# Patient Record
Sex: MALE | Race: ASIAN | NOT HISPANIC OR LATINO | Employment: OTHER | URBAN - METROPOLITAN AREA
[De-identification: names, ages, dates, MRNs, and addresses within clinical notes are randomized per-mention and may not be internally consistent; named-entity substitution may affect disease eponyms.]

---

## 2019-01-11 DIAGNOSIS — I10 HYPERTENSION: ICD-10-CM

## 2019-01-11 DIAGNOSIS — I50.9 CHF (CONGESTIVE HEART FAILURE): ICD-10-CM

## 2019-01-11 DIAGNOSIS — I50.9 CONGESTIVE HEART DISEASE: Primary | ICD-10-CM

## 2021-12-01 ENCOUNTER — HOSPITAL ENCOUNTER (INPATIENT)
Facility: HOSPITAL | Age: 81
LOS: 3 days | Discharge: HOME | DRG: 202 | End: 2021-12-04
Attending: EMERGENCY MEDICINE | Admitting: EMERGENCY MEDICINE
Payer: MEDICARE

## 2021-12-01 DIAGNOSIS — S30.1XXA HEMATOMA OF RECTUS SHEATH, INITIAL ENCOUNTER: Primary | ICD-10-CM

## 2021-12-01 DIAGNOSIS — D64.9 ANEMIA, UNSPECIFIED TYPE: ICD-10-CM

## 2021-12-01 DIAGNOSIS — J44.9 COPD (CHRONIC OBSTRUCTIVE PULMONARY DISEASE): ICD-10-CM

## 2021-12-01 DIAGNOSIS — R06.02 SHORTNESS OF BREATH: ICD-10-CM

## 2021-12-01 DIAGNOSIS — J20.9 ACUTE BRONCHITIS, UNSPECIFIED ORGANISM: ICD-10-CM

## 2021-12-01 DIAGNOSIS — R07.9 CHEST PAIN: ICD-10-CM

## 2021-12-01 DIAGNOSIS — E87.1 HYPONATREMIA: ICD-10-CM

## 2021-12-01 PROBLEM — I27.20 PULMONARY HYPERTENSION: Status: ACTIVE | Noted: 2021-12-01

## 2021-12-01 PROBLEM — E86.0 DEHYDRATION WITH HYPONATREMIA: Status: ACTIVE | Noted: 2021-12-01

## 2021-12-01 PROBLEM — E03.8 SUBCLINICAL HYPOTHYROIDISM: Status: RESOLVED | Noted: 2021-12-01 | Resolved: 2021-12-01

## 2021-12-01 PROBLEM — R93.429 ABNORMAL CT SCAN, KIDNEY: Status: ACTIVE | Noted: 2021-12-01

## 2021-12-01 PROBLEM — E03.8 SUBCLINICAL HYPOTHYROIDISM: Status: ACTIVE | Noted: 2021-12-01

## 2021-12-01 PROBLEM — T14.8XXA INTRAMUSCULAR HEMATOMA: Status: ACTIVE | Noted: 2021-12-01

## 2021-12-01 PROBLEM — E27.8 ADRENAL NODULE: Status: ACTIVE | Noted: 2021-12-01

## 2021-12-01 PROBLEM — E27.9 ADRENAL NODULE: Status: ACTIVE | Noted: 2021-12-01

## 2021-12-01 PROBLEM — N18.32 STAGE 3B CHRONIC KIDNEY DISEASE: Status: ACTIVE | Noted: 2021-12-01

## 2021-12-01 PROBLEM — E04.1 THYROID NODULE: Status: ACTIVE | Noted: 2021-12-01

## 2021-12-01 PROBLEM — T14.8XXA INTRAMUSCULAR HEMATOMA: Status: RESOLVED | Noted: 2021-12-01 | Resolved: 2021-12-01

## 2021-12-01 PROBLEM — E78.5 HYPERLIPIDEMIA: Status: ACTIVE | Noted: 2021-12-01

## 2021-12-01 PROBLEM — R91.1 INCIDENTAL LUNG NODULE, GREATER THAN OR EQUAL TO 8MM: Status: ACTIVE | Noted: 2021-12-01

## 2021-12-01 PROBLEM — D53.9 MACROCYTIC ANEMIA: Status: ACTIVE | Noted: 2021-12-01

## 2021-12-01 PROBLEM — E86.0 DEHYDRATION WITH HYPONATREMIA: Status: RESOLVED | Noted: 2021-12-01 | Resolved: 2021-12-01

## 2021-12-01 LAB
ABO + RH BLD: NORMAL
ALBUMIN SERPL BCP-MCNC: 3.9 G/DL (ref 3.5–5.2)
ALP SERPL-CCNC: 94 U/L (ref 55–135)
ALT SERPL W/O P-5'-P-CCNC: 16 U/L (ref 10–44)
ANION GAP SERPL CALC-SCNC: 10 MMOL/L (ref 8–16)
ANION GAP SERPL CALC-SCNC: 12 MMOL/L (ref 8–16)
APTT BLDCRRT: 23.9 SEC (ref 21–32)
AST SERPL-CCNC: 24 U/L (ref 10–40)
BASOPHILS # BLD AUTO: 0.02 K/UL (ref 0–0.2)
BASOPHILS NFR BLD: 0.1 % (ref 0–1.9)
BILIRUB SERPL-MCNC: 0.9 MG/DL (ref 0.1–1)
BLD GP AB SCN CELLS X3 SERPL QL: NORMAL
BNP SERPL-MCNC: 24 PG/ML (ref 0–99)
BUN SERPL-MCNC: 30 MG/DL (ref 8–23)
BUN SERPL-MCNC: 34 MG/DL (ref 8–23)
CALCIUM SERPL-MCNC: 8.5 MG/DL (ref 8.7–10.5)
CALCIUM SERPL-MCNC: 9.4 MG/DL (ref 8.7–10.5)
CHLORIDE SERPL-SCNC: 92 MMOL/L (ref 95–110)
CHLORIDE SERPL-SCNC: 93 MMOL/L (ref 95–110)
CK SERPL-CCNC: 367 U/L (ref 20–200)
CO2 SERPL-SCNC: 22 MMOL/L (ref 23–29)
CO2 SERPL-SCNC: 22 MMOL/L (ref 23–29)
CREAT SERPL-MCNC: 1.3 MG/DL (ref 0.5–1.4)
CREAT SERPL-MCNC: 1.4 MG/DL (ref 0.5–1.4)
CTP QC/QA: YES
DIFFERENTIAL METHOD: ABNORMAL
EOSINOPHIL # BLD AUTO: 0 K/UL (ref 0–0.5)
EOSINOPHIL NFR BLD: 0.1 % (ref 0–8)
ERYTHROCYTE [DISTWIDTH] IN BLOOD BY AUTOMATED COUNT: 11.7 % (ref 11.5–14.5)
EST. GFR  (AFRICAN AMERICAN): 54 ML/MIN/1.73 M^2
EST. GFR  (AFRICAN AMERICAN): 59 ML/MIN/1.73 M^2
EST. GFR  (NON AFRICAN AMERICAN): 46 ML/MIN/1.73 M^2
EST. GFR  (NON AFRICAN AMERICAN): 51 ML/MIN/1.73 M^2
GLUCOSE SERPL-MCNC: 112 MG/DL (ref 70–110)
GLUCOSE SERPL-MCNC: 193 MG/DL (ref 70–110)
HCT VFR BLD AUTO: 27 % (ref 40–54)
HGB BLD-MCNC: 9.8 G/DL (ref 14–18)
IMM GRANULOCYTES # BLD AUTO: 0.1 K/UL (ref 0–0.04)
IMM GRANULOCYTES NFR BLD AUTO: 0.7 % (ref 0–0.5)
INR PPP: 1 (ref 0.8–1.2)
IRON SERPL-MCNC: 32 UG/DL (ref 45–160)
LACTATE SERPL-SCNC: 2.2 MMOL/L (ref 0.5–2.2)
LIPASE SERPL-CCNC: 13 U/L (ref 4–60)
LYMPHOCYTES # BLD AUTO: 1.4 K/UL (ref 1–4.8)
LYMPHOCYTES NFR BLD: 10.5 % (ref 18–48)
MAGNESIUM SERPL-MCNC: 1.4 MG/DL (ref 1.6–2.6)
MCH RBC QN AUTO: 35.8 PG (ref 27–31)
MCHC RBC AUTO-ENTMCNC: 36.3 G/DL (ref 32–36)
MCV RBC AUTO: 99 FL (ref 82–98)
MONOCYTES # BLD AUTO: 1.9 K/UL (ref 0.3–1)
MONOCYTES NFR BLD: 13.8 % (ref 4–15)
NEUTROPHILS # BLD AUTO: 10.2 K/UL (ref 1.8–7.7)
NEUTROPHILS NFR BLD: 74.8 % (ref 38–73)
NRBC BLD-RTO: 0 /100 WBC
PHOSPHATE SERPL-MCNC: 3.3 MG/DL (ref 2.7–4.5)
PLATELET # BLD AUTO: 190 K/UL (ref 150–450)
PMV BLD AUTO: 10.7 FL (ref 9.2–12.9)
POTASSIUM SERPL-SCNC: 3.9 MMOL/L (ref 3.5–5.1)
POTASSIUM SERPL-SCNC: 4.1 MMOL/L (ref 3.5–5.1)
PROCALCITONIN SERPL IA-MCNC: 0.05 NG/ML
PROT SERPL-MCNC: 7.6 G/DL (ref 6–8.4)
PROTHROMBIN TIME: 10.9 SEC (ref 9–12.5)
RBC # BLD AUTO: 2.74 M/UL (ref 4.6–6.2)
SARS-COV-2 RDRP RESP QL NAA+PROBE: NEGATIVE
SATURATED IRON: 12 % (ref 20–50)
SODIUM SERPL-SCNC: 125 MMOL/L (ref 136–145)
SODIUM SERPL-SCNC: 126 MMOL/L (ref 136–145)
T4 FREE SERPL-MCNC: 1.5 NG/DL (ref 0.71–1.51)
TOTAL IRON BINDING CAPACITY: 278 UG/DL (ref 250–450)
TRANSFERRIN SERPL-MCNC: 188 MG/DL (ref 200–375)
TROPONIN I SERPL DL<=0.01 NG/ML-MCNC: 0.01 NG/ML (ref 0–0.03)
TSH SERPL DL<=0.005 MIU/L-ACNC: 0.13 UIU/ML (ref 0.4–4)
WBC # BLD AUTO: 13.67 K/UL (ref 3.9–12.7)

## 2021-12-01 PROCEDURE — 99285 EMERGENCY DEPT VISIT HI MDM: CPT | Mod: 25,HCWC

## 2021-12-01 PROCEDURE — 25000003 PHARM REV CODE 250: Mod: HCWC | Performed by: EMERGENCY MEDICINE

## 2021-12-01 PROCEDURE — 84145 PROCALCITONIN (PCT): CPT | Mod: HCWC | Performed by: STUDENT IN AN ORGANIZED HEALTH CARE EDUCATION/TRAINING PROGRAM

## 2021-12-01 PROCEDURE — 94640 AIRWAY INHALATION TREATMENT: CPT | Mod: HCWC

## 2021-12-01 PROCEDURE — 25000003 PHARM REV CODE 250: Mod: HCWC | Performed by: STUDENT IN AN ORGANIZED HEALTH CARE EDUCATION/TRAINING PROGRAM

## 2021-12-01 PROCEDURE — 11000001 HC ACUTE MED/SURG PRIVATE ROOM: Mod: HCWC

## 2021-12-01 PROCEDURE — 63600175 PHARM REV CODE 636 W HCPCS: Mod: HCWC | Performed by: EMERGENCY MEDICINE

## 2021-12-01 PROCEDURE — U0002 COVID-19 LAB TEST NON-CDC: HCPCS | Mod: HCWC | Performed by: EMERGENCY MEDICINE

## 2021-12-01 PROCEDURE — 82607 VITAMIN B-12: CPT | Mod: HCWC | Performed by: INTERNAL MEDICINE

## 2021-12-01 PROCEDURE — 83930 ASSAY OF BLOOD OSMOLALITY: CPT | Mod: HCWC | Performed by: INTERNAL MEDICINE

## 2021-12-01 PROCEDURE — 94760 N-INVAS EAR/PLS OXIMETRY 1: CPT | Mod: HCWC

## 2021-12-01 PROCEDURE — 80048 BASIC METABOLIC PNL TOTAL CA: CPT | Mod: HCWC | Performed by: INTERNAL MEDICINE

## 2021-12-01 PROCEDURE — 93010 EKG 12-LEAD: ICD-10-PCS | Mod: HCWC,,, | Performed by: INTERNAL MEDICINE

## 2021-12-01 PROCEDURE — 82746 ASSAY OF FOLIC ACID SERUM: CPT | Mod: HCWC | Performed by: INTERNAL MEDICINE

## 2021-12-01 PROCEDURE — 36415 COLL VENOUS BLD VENIPUNCTURE: CPT | Mod: HCWC | Performed by: STUDENT IN AN ORGANIZED HEALTH CARE EDUCATION/TRAINING PROGRAM

## 2021-12-01 PROCEDURE — 82550 ASSAY OF CK (CPK): CPT | Mod: HCWC | Performed by: INTERNAL MEDICINE

## 2021-12-01 PROCEDURE — 83605 ASSAY OF LACTIC ACID: CPT | Mod: HCWC | Performed by: EMERGENCY MEDICINE

## 2021-12-01 PROCEDURE — 86900 BLOOD TYPING SEROLOGIC ABO: CPT | Mod: HCWC | Performed by: INTERNAL MEDICINE

## 2021-12-01 PROCEDURE — 96375 TX/PRO/DX INJ NEW DRUG ADDON: CPT | Mod: HCWC

## 2021-12-01 PROCEDURE — 83880 ASSAY OF NATRIURETIC PEPTIDE: CPT | Mod: HCWC | Performed by: EMERGENCY MEDICINE

## 2021-12-01 PROCEDURE — 85610 PROTHROMBIN TIME: CPT | Mod: HCWC | Performed by: INTERNAL MEDICINE

## 2021-12-01 PROCEDURE — 84466 ASSAY OF TRANSFERRIN: CPT | Mod: HCWC | Performed by: INTERNAL MEDICINE

## 2021-12-01 PROCEDURE — 27000221 HC OXYGEN, UP TO 24 HOURS: Mod: HCWC

## 2021-12-01 PROCEDURE — 80053 COMPREHEN METABOLIC PANEL: CPT | Mod: HCWC | Performed by: EMERGENCY MEDICINE

## 2021-12-01 PROCEDURE — 36415 COLL VENOUS BLD VENIPUNCTURE: CPT | Mod: HCWC | Performed by: INTERNAL MEDICINE

## 2021-12-01 PROCEDURE — 25000242 PHARM REV CODE 250 ALT 637 W/ HCPCS: Mod: HCWC | Performed by: INTERNAL MEDICINE

## 2021-12-01 PROCEDURE — 25500020 PHARM REV CODE 255: Mod: HCWC | Performed by: EMERGENCY MEDICINE

## 2021-12-01 PROCEDURE — 93010 ELECTROCARDIOGRAM REPORT: CPT | Mod: HCWC,,, | Performed by: INTERNAL MEDICINE

## 2021-12-01 PROCEDURE — 83735 ASSAY OF MAGNESIUM: CPT | Mod: HCWC | Performed by: INTERNAL MEDICINE

## 2021-12-01 PROCEDURE — 96374 THER/PROPH/DIAG INJ IV PUSH: CPT | Mod: HCWC

## 2021-12-01 PROCEDURE — 85730 THROMBOPLASTIN TIME PARTIAL: CPT | Mod: HCWC | Performed by: INTERNAL MEDICINE

## 2021-12-01 PROCEDURE — 84439 ASSAY OF FREE THYROXINE: CPT | Mod: HCWC | Performed by: EMERGENCY MEDICINE

## 2021-12-01 PROCEDURE — 84443 ASSAY THYROID STIM HORMONE: CPT | Mod: HCWC | Performed by: EMERGENCY MEDICINE

## 2021-12-01 PROCEDURE — 25000242 PHARM REV CODE 250 ALT 637 W/ HCPCS: Mod: HCWC | Performed by: EMERGENCY MEDICINE

## 2021-12-01 PROCEDURE — 25000003 PHARM REV CODE 250: Mod: HCWC | Performed by: INTERNAL MEDICINE

## 2021-12-01 PROCEDURE — 84484 ASSAY OF TROPONIN QUANT: CPT | Mod: HCWC | Performed by: EMERGENCY MEDICINE

## 2021-12-01 PROCEDURE — 85025 COMPLETE CBC W/AUTO DIFF WBC: CPT | Mod: HCWC | Performed by: EMERGENCY MEDICINE

## 2021-12-01 PROCEDURE — 84100 ASSAY OF PHOSPHORUS: CPT | Mod: HCWC | Performed by: INTERNAL MEDICINE

## 2021-12-01 PROCEDURE — 93005 ELECTROCARDIOGRAM TRACING: CPT | Mod: HCWC

## 2021-12-01 PROCEDURE — 83690 ASSAY OF LIPASE: CPT | Mod: HCWC | Performed by: EMERGENCY MEDICINE

## 2021-12-01 RX ORDER — ACETAMINOPHEN 500 MG
500 TABLET ORAL EVERY 6 HOURS PRN
Status: DISCONTINUED | OUTPATIENT
Start: 2021-12-01 | End: 2021-12-01

## 2021-12-01 RX ORDER — SODIUM CHLORIDE 0.9 % (FLUSH) 0.9 %
10 SYRINGE (ML) INJECTION EVERY 12 HOURS PRN
Status: DISCONTINUED | OUTPATIENT
Start: 2021-12-01 | End: 2021-12-01

## 2021-12-01 RX ORDER — ALBUTEROL SULFATE 2.5 MG/.5ML
2.5 SOLUTION RESPIRATORY (INHALATION) EVERY 4 HOURS PRN
Status: DISCONTINUED | OUTPATIENT
Start: 2021-12-01 | End: 2021-12-01

## 2021-12-01 RX ORDER — GUAIFENESIN/DEXTROMETHORPHAN 100-10MG/5
5 SYRUP ORAL EVERY 6 HOURS
Status: DISCONTINUED | OUTPATIENT
Start: 2021-12-02 | End: 2021-12-03

## 2021-12-01 RX ORDER — BENZONATATE 100 MG/1
100 CAPSULE ORAL 3 TIMES DAILY PRN
Status: DISCONTINUED | OUTPATIENT
Start: 2021-12-01 | End: 2021-12-04 | Stop reason: HOSPADM

## 2021-12-01 RX ORDER — LATANOPROST 50 UG/ML
1 SOLUTION/ DROPS OPHTHALMIC NIGHTLY
Status: DISCONTINUED | OUTPATIENT
Start: 2021-12-01 | End: 2021-12-04 | Stop reason: HOSPADM

## 2021-12-01 RX ORDER — METHYLPREDNISOLONE SOD SUCC 125 MG
125 VIAL (EA) INJECTION
Status: COMPLETED | OUTPATIENT
Start: 2021-12-01 | End: 2021-12-01

## 2021-12-01 RX ORDER — DOXYCYCLINE HYCLATE 100 MG
100 TABLET ORAL EVERY 12 HOURS
Status: DISCONTINUED | OUTPATIENT
Start: 2021-12-01 | End: 2021-12-04 | Stop reason: HOSPADM

## 2021-12-01 RX ORDER — MIRTAZAPINE 15 MG/1
15 TABLET, FILM COATED ORAL NIGHTLY
Status: DISCONTINUED | OUTPATIENT
Start: 2021-12-01 | End: 2021-12-04 | Stop reason: HOSPADM

## 2021-12-01 RX ORDER — TIMOLOL MALEATE 5 MG/ML
1 SOLUTION/ DROPS OPHTHALMIC DAILY
Status: DISCONTINUED | OUTPATIENT
Start: 2021-12-02 | End: 2021-12-04 | Stop reason: HOSPADM

## 2021-12-01 RX ORDER — IPRATROPIUM BROMIDE AND ALBUTEROL SULFATE 2.5; .5 MG/3ML; MG/3ML
3 SOLUTION RESPIRATORY (INHALATION) EVERY 4 HOURS PRN
Status: DISCONTINUED | OUTPATIENT
Start: 2021-12-01 | End: 2021-12-03

## 2021-12-01 RX ORDER — GLUCAGON 1 MG
1 KIT INJECTION
Status: DISCONTINUED | OUTPATIENT
Start: 2021-12-01 | End: 2021-12-04 | Stop reason: HOSPADM

## 2021-12-01 RX ORDER — PANTOPRAZOLE SODIUM 40 MG/1
40 TABLET, DELAYED RELEASE ORAL DAILY
Refills: 1 | Status: DISCONTINUED | OUTPATIENT
Start: 2021-12-02 | End: 2021-12-04 | Stop reason: HOSPADM

## 2021-12-01 RX ORDER — SODIUM CHLORIDE 0.9 % (FLUSH) 0.9 %
10 SYRINGE (ML) INJECTION EVERY 12 HOURS PRN
Status: DISCONTINUED | OUTPATIENT
Start: 2021-12-01 | End: 2021-12-04 | Stop reason: HOSPADM

## 2021-12-01 RX ORDER — METHIMAZOLE 5 MG/1
5 TABLET ORAL 3 TIMES DAILY
Status: DISCONTINUED | OUTPATIENT
Start: 2021-12-01 | End: 2021-12-01

## 2021-12-01 RX ORDER — ACETAMINOPHEN 325 MG/1
650 TABLET ORAL EVERY 4 HOURS PRN
Status: DISCONTINUED | OUTPATIENT
Start: 2021-12-01 | End: 2021-12-04 | Stop reason: HOSPADM

## 2021-12-01 RX ORDER — IBUPROFEN 200 MG
16 TABLET ORAL
Status: DISCONTINUED | OUTPATIENT
Start: 2021-12-01 | End: 2021-12-04 | Stop reason: HOSPADM

## 2021-12-01 RX ORDER — TALC
6 POWDER (GRAM) TOPICAL NIGHTLY PRN
Status: DISCONTINUED | OUTPATIENT
Start: 2021-12-01 | End: 2021-12-04 | Stop reason: HOSPADM

## 2021-12-01 RX ORDER — CARVEDILOL 12.5 MG/1
25 TABLET ORAL 2 TIMES DAILY WITH MEALS
Status: DISCONTINUED | OUTPATIENT
Start: 2021-12-01 | End: 2021-12-04 | Stop reason: HOSPADM

## 2021-12-01 RX ORDER — OXYCODONE HYDROCHLORIDE 5 MG/1
5 TABLET ORAL EVERY 6 HOURS PRN
Status: DISCONTINUED | OUTPATIENT
Start: 2021-12-01 | End: 2021-12-04 | Stop reason: HOSPADM

## 2021-12-01 RX ORDER — IBUPROFEN 200 MG
24 TABLET ORAL
Status: DISCONTINUED | OUTPATIENT
Start: 2021-12-01 | End: 2021-12-04 | Stop reason: HOSPADM

## 2021-12-01 RX ORDER — ATORVASTATIN CALCIUM 10 MG/1
20 TABLET, FILM COATED ORAL DAILY
Status: DISCONTINUED | OUTPATIENT
Start: 2021-12-02 | End: 2021-12-04 | Stop reason: HOSPADM

## 2021-12-01 RX ORDER — MAG HYDROX/ALUMINUM HYD/SIMETH 200-200-20
30 SUSPENSION, ORAL (FINAL DOSE FORM) ORAL 4 TIMES DAILY PRN
Status: DISCONTINUED | OUTPATIENT
Start: 2021-12-01 | End: 2021-12-04 | Stop reason: HOSPADM

## 2021-12-01 RX ORDER — HYDRALAZINE HYDROCHLORIDE 20 MG/ML
10 INJECTION INTRAMUSCULAR; INTRAVENOUS
Status: COMPLETED | OUTPATIENT
Start: 2021-12-01 | End: 2021-12-01

## 2021-12-01 RX ORDER — AMLODIPINE BESYLATE 5 MG/1
10 TABLET ORAL DAILY
Status: DISCONTINUED | OUTPATIENT
Start: 2021-12-01 | End: 2021-12-04 | Stop reason: HOSPADM

## 2021-12-01 RX ORDER — NALOXONE HCL 0.4 MG/ML
0.02 VIAL (ML) INJECTION
Status: DISCONTINUED | OUTPATIENT
Start: 2021-12-01 | End: 2021-12-04 | Stop reason: HOSPADM

## 2021-12-01 RX ORDER — IPRATROPIUM BROMIDE AND ALBUTEROL SULFATE 2.5; .5 MG/3ML; MG/3ML
3 SOLUTION RESPIRATORY (INHALATION)
Status: COMPLETED | OUTPATIENT
Start: 2021-12-01 | End: 2021-12-01

## 2021-12-01 RX ORDER — AMOXICILLIN 250 MG
1 CAPSULE ORAL 2 TIMES DAILY PRN
Status: DISCONTINUED | OUTPATIENT
Start: 2021-12-01 | End: 2021-12-04 | Stop reason: HOSPADM

## 2021-12-01 RX ORDER — ONDANSETRON 2 MG/ML
4 INJECTION INTRAMUSCULAR; INTRAVENOUS EVERY 8 HOURS PRN
Status: DISCONTINUED | OUTPATIENT
Start: 2021-12-01 | End: 2021-12-04 | Stop reason: HOSPADM

## 2021-12-01 RX ORDER — PREDNISONE 20 MG/1
40 TABLET ORAL DAILY
Status: DISCONTINUED | OUTPATIENT
Start: 2021-12-02 | End: 2021-12-04 | Stop reason: HOSPADM

## 2021-12-01 RX ORDER — SIMETHICONE 80 MG
1 TABLET,CHEWABLE ORAL 4 TIMES DAILY PRN
Status: DISCONTINUED | OUTPATIENT
Start: 2021-12-01 | End: 2021-12-04 | Stop reason: HOSPADM

## 2021-12-01 RX ORDER — IPRATROPIUM BROMIDE AND ALBUTEROL SULFATE 2.5; .5 MG/3ML; MG/3ML
3 SOLUTION RESPIRATORY (INHALATION) EVERY 6 HOURS
Status: DISCONTINUED | OUTPATIENT
Start: 2021-12-01 | End: 2021-12-04 | Stop reason: HOSPADM

## 2021-12-01 RX ORDER — LEVOTHYROXINE SODIUM 25 UG/1
25 TABLET ORAL
Status: DISCONTINUED | OUTPATIENT
Start: 2021-12-02 | End: 2021-12-01

## 2021-12-01 RX ADMIN — SODIUM CHLORIDE 1000 ML: 0.9 INJECTION, SOLUTION INTRAVENOUS at 04:12

## 2021-12-01 RX ADMIN — IPRATROPIUM BROMIDE AND ALBUTEROL SULFATE 3 ML: .5; 3 SOLUTION RESPIRATORY (INHALATION) at 12:12

## 2021-12-01 RX ADMIN — IOHEXOL 100 ML: 350 INJECTION, SOLUTION INTRAVENOUS at 01:12

## 2021-12-01 RX ADMIN — METHYLPREDNISOLONE SODIUM SUCCINATE 125 MG: 125 INJECTION, POWDER, FOR SOLUTION INTRAMUSCULAR; INTRAVENOUS at 12:12

## 2021-12-01 RX ADMIN — MIRTAZAPINE 15 MG: 15 TABLET, FILM COATED ORAL at 08:12

## 2021-12-01 RX ADMIN — LATANOPROST 1 DROP: 50 SOLUTION OPHTHALMIC at 09:12

## 2021-12-01 RX ADMIN — HYDRALAZINE HYDROCHLORIDE 10 MG: 20 INJECTION, SOLUTION INTRAMUSCULAR; INTRAVENOUS at 12:12

## 2021-12-01 RX ADMIN — AMLODIPINE BESYLATE 10 MG: 5 TABLET ORAL at 08:12

## 2021-12-01 RX ADMIN — DOXYCYCLINE HYCLATE 100 MG: 100 TABLET, COATED ORAL at 08:12

## 2021-12-01 RX ADMIN — OXYCODONE 5 MG: 5 TABLET ORAL at 08:12

## 2021-12-01 RX ADMIN — BENZONATATE 100 MG: 100 CAPSULE ORAL at 08:12

## 2021-12-01 RX ADMIN — IPRATROPIUM BROMIDE AND ALBUTEROL SULFATE 3 ML: .5; 3 SOLUTION RESPIRATORY (INHALATION) at 09:12

## 2021-12-01 RX ADMIN — CARVEDILOL 25 MG: 12.5 TABLET, FILM COATED ORAL at 05:12

## 2021-12-02 LAB
ALBUMIN SERPL BCP-MCNC: 3.6 G/DL (ref 3.5–5.2)
ALP SERPL-CCNC: 80 U/L (ref 55–135)
ALT SERPL W/O P-5'-P-CCNC: 16 U/L (ref 10–44)
ANION GAP SERPL CALC-SCNC: 11 MMOL/L (ref 8–16)
AORTIC ROOT ANNULUS: 3.57 CM
AORTIC VALVE CUSP SEPERATION: 2.46 CM
AST SERPL-CCNC: 26 U/L (ref 10–40)
AV INDEX (PROSTH): 0.61
AV MEAN GRADIENT: 4 MMHG
AV PEAK GRADIENT: 7 MMHG
AV VALVE AREA: 2.76 CM2
AV VELOCITY RATIO: 0.72
BASOPHILS # BLD AUTO: 0.01 K/UL (ref 0–0.2)
BASOPHILS NFR BLD: 0.1 % (ref 0–1.9)
BILIRUB SERPL-MCNC: 1 MG/DL (ref 0.1–1)
BSA FOR ECHO PROCEDURE: 1.77 M2
BUN SERPL-MCNC: 32 MG/DL (ref 8–23)
CALCIUM SERPL-MCNC: 8.8 MG/DL (ref 8.7–10.5)
CHLORIDE SERPL-SCNC: 93 MMOL/L (ref 95–110)
CHOLEST SERPL-MCNC: 116 MG/DL (ref 120–199)
CHOLEST/HDLC SERPL: 3.2 {RATIO} (ref 2–5)
CO2 SERPL-SCNC: 24 MMOL/L (ref 23–29)
CREAT SERPL-MCNC: 1.4 MG/DL (ref 0.5–1.4)
CV ECHO LV RWT: 0.57 CM
DIFFERENTIAL METHOD: ABNORMAL
DOP CALC AO PEAK VEL: 1.3 M/S
DOP CALC AO VTI: 27.68 CM
DOP CALC LVOT AREA: 4.6 CM2
DOP CALC LVOT DIAMETER: 2.41 CM
DOP CALC LVOT PEAK VEL: 0.93 M/S
DOP CALC LVOT STROKE VOLUME: 76.51 CM3
DOP CALCLVOT PEAK VEL VTI: 16.78 CM
E WAVE DECELERATION TIME: 170.69 MSEC
E/A RATIO: 0.54
E/E' RATIO: 14.29 M/S
ECHO LV POSTERIOR WALL: 1.14 CM (ref 0.6–1.1)
EJECTION FRACTION: 60 %
EOSINOPHIL # BLD AUTO: 0 K/UL (ref 0–0.5)
EOSINOPHIL NFR BLD: 0 % (ref 0–8)
ERYTHROCYTE [DISTWIDTH] IN BLOOD BY AUTOMATED COUNT: 11.9 % (ref 11.5–14.5)
EST. GFR  (AFRICAN AMERICAN): 54 ML/MIN/1.73 M^2
EST. GFR  (NON AFRICAN AMERICAN): 46 ML/MIN/1.73 M^2
FOLATE SERPL-MCNC: 18.7 NG/ML (ref 4–24)
FRACTIONAL SHORTENING: 28 % (ref 28–44)
GLUCOSE SERPL-MCNC: 133 MG/DL (ref 70–110)
HCT VFR BLD AUTO: 25.5 % (ref 40–54)
HDLC SERPL-MCNC: 36 MG/DL (ref 40–75)
HDLC SERPL: 31 % (ref 20–50)
HGB BLD-MCNC: 8.7 G/DL (ref 14–18)
IMM GRANULOCYTES # BLD AUTO: 0.07 K/UL (ref 0–0.04)
IMM GRANULOCYTES NFR BLD AUTO: 0.6 % (ref 0–0.5)
INTERVENTRICULAR SEPTUM: 1.18 CM (ref 0.6–1.1)
IVRT: 129.18 MSEC
LA MAJOR: 3.91 CM
LA MINOR: 4.18 CM
LA WIDTH: 3.22 CM
LDLC SERPL CALC-MCNC: 54.6 MG/DL (ref 63–159)
LEFT ATRIUM SIZE: 3.66 CM
LEFT ATRIUM VOLUME INDEX: 23.3 ML/M2
LEFT ATRIUM VOLUME: 40.48 CM3
LEFT INTERNAL DIMENSION IN SYSTOLE: 2.88 CM (ref 2.1–4)
LEFT VENTRICLE DIASTOLIC VOLUME INDEX: 40.61 ML/M2
LEFT VENTRICLE DIASTOLIC VOLUME: 70.66 ML
LEFT VENTRICLE MASS INDEX: 91 G/M2
LEFT VENTRICLE SYSTOLIC VOLUME INDEX: 18.2 ML/M2
LEFT VENTRICLE SYSTOLIC VOLUME: 31.7 ML
LEFT VENTRICULAR INTERNAL DIMENSION IN DIASTOLE: 4.02 CM (ref 3.5–6)
LEFT VENTRICULAR MASS: 158.58 G
LV LATERAL E/E' RATIO: 12.5 M/S
LV SEPTAL E/E' RATIO: 16.67 M/S
LYMPHOCYTES # BLD AUTO: 0.9 K/UL (ref 1–4.8)
LYMPHOCYTES NFR BLD: 8.4 % (ref 18–48)
MAGNESIUM SERPL-MCNC: 1.6 MG/DL (ref 1.6–2.6)
MCH RBC QN AUTO: 34.7 PG (ref 27–31)
MCHC RBC AUTO-ENTMCNC: 34.1 G/DL (ref 32–36)
MCV RBC AUTO: 102 FL (ref 82–98)
MONOCYTES # BLD AUTO: 0.9 K/UL (ref 0.3–1)
MONOCYTES NFR BLD: 8.3 % (ref 4–15)
MV PEAK A VEL: 0.92 M/S
MV PEAK E VEL: 0.5 M/S
NEUTROPHILS # BLD AUTO: 8.9 K/UL (ref 1.8–7.7)
NEUTROPHILS NFR BLD: 82.6 % (ref 38–73)
NONHDLC SERPL-MCNC: 80 MG/DL
NRBC BLD-RTO: 0 /100 WBC
OSMOLALITY SERPL: 282 MOSM/KG (ref 280–300)
OSMOLALITY UR: 431 MOSM/KG (ref 50–1200)
PHOSPHATE SERPL-MCNC: 4.2 MG/DL (ref 2.7–4.5)
PISA TR MAX VEL: 2.75 M/S
PLATELET # BLD AUTO: 178 K/UL (ref 150–450)
PMV BLD AUTO: 10.4 FL (ref 9.2–12.9)
POTASSIUM SERPL-SCNC: 4 MMOL/L (ref 3.5–5.1)
PROT SERPL-MCNC: 6.8 G/DL (ref 6–8.4)
PV PEAK VELOCITY: 1.11 CM/S
RA MAJOR: 4.78 CM
RA PRESSURE: 3 MMHG
RA WIDTH: 2.85 CM
RBC # BLD AUTO: 2.51 M/UL (ref 4.6–6.2)
RIGHT VENTRICULAR END-DIASTOLIC DIMENSION: 2.9 CM
RV TISSUE DOPPLER FREE WALL SYSTOLIC VELOCITY 1 (APICAL 4 CHAMBER VIEW): 10.17 CM/S
SINUS: 3.79 CM
SODIUM SERPL-SCNC: 128 MMOL/L (ref 136–145)
SODIUM UR-SCNC: 65 MMOL/L (ref 20–250)
STJ: 3.26 CM
TDI LATERAL: 0.04 M/S
TDI SEPTAL: 0.03 M/S
TDI: 0.04 M/S
TR MAX PG: 30 MMHG
TRICUSPID ANNULAR PLANE SYSTOLIC EXCURSION: 2.11 CM
TRIGL SERPL-MCNC: 127 MG/DL (ref 30–150)
TV REST PULMONARY ARTERY PRESSURE: 33 MMHG
VIT B12 SERPL-MCNC: 720 PG/ML (ref 210–950)
WBC # BLD AUTO: 10.78 K/UL (ref 3.9–12.7)

## 2021-12-02 PROCEDURE — 80061 LIPID PANEL: CPT | Mod: HCWC | Performed by: INTERNAL MEDICINE

## 2021-12-02 PROCEDURE — 63600175 PHARM REV CODE 636 W HCPCS: Mod: HCWC | Performed by: INTERNAL MEDICINE

## 2021-12-02 PROCEDURE — 25000242 PHARM REV CODE 250 ALT 637 W/ HCPCS: Mod: HCWC | Performed by: INTERNAL MEDICINE

## 2021-12-02 PROCEDURE — 85025 COMPLETE CBC W/AUTO DIFF WBC: CPT | Mod: HCWC | Performed by: STUDENT IN AN ORGANIZED HEALTH CARE EDUCATION/TRAINING PROGRAM

## 2021-12-02 PROCEDURE — 25000003 PHARM REV CODE 250: Mod: HCWC | Performed by: STUDENT IN AN ORGANIZED HEALTH CARE EDUCATION/TRAINING PROGRAM

## 2021-12-02 PROCEDURE — 11000001 HC ACUTE MED/SURG PRIVATE ROOM: Mod: HCWC

## 2021-12-02 PROCEDURE — 94761 N-INVAS EAR/PLS OXIMETRY MLT: CPT | Mod: HCWC

## 2021-12-02 PROCEDURE — 84300 ASSAY OF URINE SODIUM: CPT | Mod: HCWC | Performed by: INTERNAL MEDICINE

## 2021-12-02 PROCEDURE — 83735 ASSAY OF MAGNESIUM: CPT | Mod: HCWC | Performed by: STUDENT IN AN ORGANIZED HEALTH CARE EDUCATION/TRAINING PROGRAM

## 2021-12-02 PROCEDURE — 83935 ASSAY OF URINE OSMOLALITY: CPT | Mod: HCWC | Performed by: INTERNAL MEDICINE

## 2021-12-02 PROCEDURE — 36415 COLL VENOUS BLD VENIPUNCTURE: CPT | Mod: HCWC | Performed by: INTERNAL MEDICINE

## 2021-12-02 PROCEDURE — 80053 COMPREHEN METABOLIC PANEL: CPT | Mod: HCWC | Performed by: STUDENT IN AN ORGANIZED HEALTH CARE EDUCATION/TRAINING PROGRAM

## 2021-12-02 PROCEDURE — 27000221 HC OXYGEN, UP TO 24 HOURS: Mod: HCWC

## 2021-12-02 PROCEDURE — 84100 ASSAY OF PHOSPHORUS: CPT | Mod: HCWC | Performed by: STUDENT IN AN ORGANIZED HEALTH CARE EDUCATION/TRAINING PROGRAM

## 2021-12-02 PROCEDURE — 94640 AIRWAY INHALATION TREATMENT: CPT | Mod: HCWC

## 2021-12-02 PROCEDURE — 25000003 PHARM REV CODE 250: Mod: HCWC | Performed by: INTERNAL MEDICINE

## 2021-12-02 PROCEDURE — 86480 TB TEST CELL IMMUN MEASURE: CPT | Mod: HCWC | Performed by: INTERNAL MEDICINE

## 2021-12-02 RX ORDER — SODIUM CHLORIDE 9 MG/ML
INJECTION, SOLUTION INTRAVENOUS CONTINUOUS
Status: DISCONTINUED | OUTPATIENT
Start: 2021-12-02 | End: 2021-12-03

## 2021-12-02 RX ADMIN — IPRATROPIUM BROMIDE AND ALBUTEROL SULFATE 3 ML: .5; 3 SOLUTION RESPIRATORY (INHALATION) at 01:12

## 2021-12-02 RX ADMIN — IPRATROPIUM BROMIDE AND ALBUTEROL SULFATE 3 ML: .5; 3 SOLUTION RESPIRATORY (INHALATION) at 07:12

## 2021-12-02 RX ADMIN — DOXYCYCLINE HYCLATE 100 MG: 100 TABLET, COATED ORAL at 08:12

## 2021-12-02 RX ADMIN — CARVEDILOL 25 MG: 12.5 TABLET, FILM COATED ORAL at 08:12

## 2021-12-02 RX ADMIN — IPRATROPIUM BROMIDE AND ALBUTEROL SULFATE 3 ML: .5; 3 SOLUTION RESPIRATORY (INHALATION) at 08:12

## 2021-12-02 RX ADMIN — GUAIFENESIN AND DEXTROMETHORPHAN 5 ML: 100; 10 SYRUP ORAL at 05:12

## 2021-12-02 RX ADMIN — IPRATROPIUM BROMIDE AND ALBUTEROL SULFATE 3 ML: .5; 3 SOLUTION RESPIRATORY (INHALATION) at 12:12

## 2021-12-02 RX ADMIN — TIMOLOL MALEATE 1 DROP: 5 SOLUTION/ DROPS OPHTHALMIC at 08:12

## 2021-12-02 RX ADMIN — CARVEDILOL 25 MG: 12.5 TABLET, FILM COATED ORAL at 05:12

## 2021-12-02 RX ADMIN — GUAIFENESIN AND DEXTROMETHORPHAN 5 ML: 100; 10 SYRUP ORAL at 01:12

## 2021-12-02 RX ADMIN — PREDNISONE 40 MG: 20 TABLET ORAL at 08:12

## 2021-12-02 RX ADMIN — AMLODIPINE BESYLATE 10 MG: 5 TABLET ORAL at 08:12

## 2021-12-02 RX ADMIN — PANTOPRAZOLE SODIUM 40 MG: 40 TABLET, DELAYED RELEASE ORAL at 08:12

## 2021-12-02 RX ADMIN — GUAIFENESIN AND DEXTROMETHORPHAN 5 ML: 100; 10 SYRUP ORAL at 06:12

## 2021-12-02 RX ADMIN — GUAIFENESIN AND DEXTROMETHORPHAN 5 ML: 100; 10 SYRUP ORAL at 11:12

## 2021-12-02 RX ADMIN — ATORVASTATIN CALCIUM 20 MG: 10 TABLET, FILM COATED ORAL at 08:12

## 2021-12-02 RX ADMIN — LATANOPROST 1 DROP: 50 SOLUTION OPHTHALMIC at 08:12

## 2021-12-02 RX ADMIN — GUAIFENESIN AND DEXTROMETHORPHAN 5 ML: 100; 10 SYRUP ORAL at 12:12

## 2021-12-02 RX ADMIN — SODIUM CHLORIDE: 0.9 INJECTION, SOLUTION INTRAVENOUS at 08:12

## 2021-12-02 RX ADMIN — MIRTAZAPINE 15 MG: 15 TABLET, FILM COATED ORAL at 08:12

## 2021-12-03 LAB
ALBUMIN SERPL BCP-MCNC: 3.3 G/DL (ref 3.5–5.2)
ALP SERPL-CCNC: 69 U/L (ref 55–135)
ALT SERPL W/O P-5'-P-CCNC: 18 U/L (ref 10–44)
ANION GAP SERPL CALC-SCNC: 9 MMOL/L (ref 8–16)
AST SERPL-CCNC: 30 U/L (ref 10–40)
BASOPHILS # BLD AUTO: 0 K/UL (ref 0–0.2)
BASOPHILS NFR BLD: 0 % (ref 0–1.9)
BILIRUB SERPL-MCNC: 1 MG/DL (ref 0.1–1)
BNP SERPL-MCNC: 31 PG/ML (ref 0–99)
BUN SERPL-MCNC: 38 MG/DL (ref 8–23)
CALCIUM SERPL-MCNC: 8.4 MG/DL (ref 8.7–10.5)
CHLORIDE SERPL-SCNC: 98 MMOL/L (ref 95–110)
CO2 SERPL-SCNC: 26 MMOL/L (ref 23–29)
CREAT SERPL-MCNC: 1.5 MG/DL (ref 0.5–1.4)
DIFFERENTIAL METHOD: ABNORMAL
EOSINOPHIL # BLD AUTO: 0 K/UL (ref 0–0.5)
EOSINOPHIL NFR BLD: 0 % (ref 0–8)
ERYTHROCYTE [DISTWIDTH] IN BLOOD BY AUTOMATED COUNT: 11.9 % (ref 11.5–14.5)
EST. GFR  (AFRICAN AMERICAN): 49 ML/MIN/1.73 M^2
EST. GFR  (NON AFRICAN AMERICAN): 43 ML/MIN/1.73 M^2
GAMMA INTERFERON BACKGROUND BLD IA-ACNC: 0.03 IU/ML
GLUCOSE SERPL-MCNC: 104 MG/DL (ref 70–110)
HCT VFR BLD AUTO: 24 % (ref 40–54)
HGB BLD-MCNC: 8.5 G/DL (ref 14–18)
IMM GRANULOCYTES # BLD AUTO: 0.12 K/UL (ref 0–0.04)
IMM GRANULOCYTES NFR BLD AUTO: 1.1 % (ref 0–0.5)
LYMPHOCYTES # BLD AUTO: 1.2 K/UL (ref 1–4.8)
LYMPHOCYTES NFR BLD: 10.8 % (ref 18–48)
M TB IFN-G CD4+ BCKGRND COR BLD-ACNC: 0 IU/ML
MAGNESIUM SERPL-MCNC: 1.7 MG/DL (ref 1.6–2.6)
MCH RBC QN AUTO: 34.8 PG (ref 27–31)
MCHC RBC AUTO-ENTMCNC: 35.4 G/DL (ref 32–36)
MCV RBC AUTO: 98 FL (ref 82–98)
MITOGEN IGNF BCKGRD COR BLD-ACNC: 0.93 IU/ML
MONOCYTES # BLD AUTO: 1.6 K/UL (ref 0.3–1)
MONOCYTES NFR BLD: 14.3 % (ref 4–15)
NEUTROPHILS # BLD AUTO: 8.3 K/UL (ref 1.8–7.7)
NEUTROPHILS NFR BLD: 73.8 % (ref 38–73)
NRBC BLD-RTO: 0 /100 WBC
PHOSPHATE SERPL-MCNC: 3 MG/DL (ref 2.7–4.5)
PLATELET # BLD AUTO: 174 K/UL (ref 150–450)
PMV BLD AUTO: 10.7 FL (ref 9.2–12.9)
POTASSIUM SERPL-SCNC: 3.5 MMOL/L (ref 3.5–5.1)
PROT SERPL-MCNC: 6.2 G/DL (ref 6–8.4)
RBC # BLD AUTO: 2.44 M/UL (ref 4.6–6.2)
SODIUM SERPL-SCNC: 133 MMOL/L (ref 136–145)
TB GOLD PLUS: NEGATIVE
TB2 - NIL: 0 IU/ML
WBC # BLD AUTO: 11.21 K/UL (ref 3.9–12.7)

## 2021-12-03 PROCEDURE — 99232 PR SUBSEQUENT HOSPITAL CARE,LEVL II: ICD-10-PCS | Mod: HCWC,,, | Performed by: SURGERY

## 2021-12-03 PROCEDURE — 83735 ASSAY OF MAGNESIUM: CPT | Mod: HCWC | Performed by: STUDENT IN AN ORGANIZED HEALTH CARE EDUCATION/TRAINING PROGRAM

## 2021-12-03 PROCEDURE — 99232 SBSQ HOSP IP/OBS MODERATE 35: CPT | Mod: HCWC,,, | Performed by: SURGERY

## 2021-12-03 PROCEDURE — 94760 N-INVAS EAR/PLS OXIMETRY 1: CPT | Mod: HCWC

## 2021-12-03 PROCEDURE — 83880 ASSAY OF NATRIURETIC PEPTIDE: CPT | Mod: HCWC | Performed by: STUDENT IN AN ORGANIZED HEALTH CARE EDUCATION/TRAINING PROGRAM

## 2021-12-03 PROCEDURE — 80053 COMPREHEN METABOLIC PANEL: CPT | Mod: HCWC | Performed by: STUDENT IN AN ORGANIZED HEALTH CARE EDUCATION/TRAINING PROGRAM

## 2021-12-03 PROCEDURE — 27000221 HC OXYGEN, UP TO 24 HOURS: Mod: HCWC

## 2021-12-03 PROCEDURE — 25000242 PHARM REV CODE 250 ALT 637 W/ HCPCS: Mod: HCWC | Performed by: INTERNAL MEDICINE

## 2021-12-03 PROCEDURE — 25000003 PHARM REV CODE 250: Mod: HCWC | Performed by: INTERNAL MEDICINE

## 2021-12-03 PROCEDURE — 84100 ASSAY OF PHOSPHORUS: CPT | Mod: HCWC | Performed by: STUDENT IN AN ORGANIZED HEALTH CARE EDUCATION/TRAINING PROGRAM

## 2021-12-03 PROCEDURE — 27100171 HC OXYGEN HIGH FLOW UP TO 24 HOURS: Mod: HCWC

## 2021-12-03 PROCEDURE — 85025 COMPLETE CBC W/AUTO DIFF WBC: CPT | Mod: HCWC | Performed by: STUDENT IN AN ORGANIZED HEALTH CARE EDUCATION/TRAINING PROGRAM

## 2021-12-03 PROCEDURE — 94645 CONT INHLJ TX EACH ADDL HOUR: CPT | Mod: HCWC

## 2021-12-03 PROCEDURE — 25000003 PHARM REV CODE 250: Mod: HCWC | Performed by: STUDENT IN AN ORGANIZED HEALTH CARE EDUCATION/TRAINING PROGRAM

## 2021-12-03 PROCEDURE — 36415 COLL VENOUS BLD VENIPUNCTURE: CPT | Mod: HCWC | Performed by: STUDENT IN AN ORGANIZED HEALTH CARE EDUCATION/TRAINING PROGRAM

## 2021-12-03 PROCEDURE — 94640 AIRWAY INHALATION TREATMENT: CPT | Mod: HCWC

## 2021-12-03 PROCEDURE — 63600175 PHARM REV CODE 636 W HCPCS: Mod: HCWC | Performed by: INTERNAL MEDICINE

## 2021-12-03 PROCEDURE — 11000001 HC ACUTE MED/SURG PRIVATE ROOM: Mod: HCWC

## 2021-12-03 RX ORDER — IPRATROPIUM BROMIDE AND ALBUTEROL SULFATE 2.5; .5 MG/3ML; MG/3ML
3 SOLUTION RESPIRATORY (INHALATION)
Status: DISCONTINUED | OUTPATIENT
Start: 2021-12-03 | End: 2021-12-03

## 2021-12-03 RX ORDER — LANOLIN ALCOHOL/MO/W.PET/CERES
1 CREAM (GRAM) TOPICAL DAILY
Status: DISCONTINUED | OUTPATIENT
Start: 2021-12-03 | End: 2021-12-04 | Stop reason: HOSPADM

## 2021-12-03 RX ORDER — PROMETHAZINE HYDROCHLORIDE AND CODEINE PHOSPHATE 6.25; 1 MG/5ML; MG/5ML
5 SOLUTION ORAL EVERY 4 HOURS PRN
Status: DISCONTINUED | OUTPATIENT
Start: 2021-12-03 | End: 2021-12-04 | Stop reason: HOSPADM

## 2021-12-03 RX ADMIN — CARVEDILOL 25 MG: 12.5 TABLET, FILM COATED ORAL at 04:12

## 2021-12-03 RX ADMIN — PROMETHAZINE HYDROCHLORIDE AND CODEINE PHOSPHATE 5 ML: 10; 6.25 SOLUTION ORAL at 04:12

## 2021-12-03 RX ADMIN — PREDNISONE 40 MG: 20 TABLET ORAL at 08:12

## 2021-12-03 RX ADMIN — DOXYCYCLINE HYCLATE 100 MG: 100 TABLET, COATED ORAL at 08:12

## 2021-12-03 RX ADMIN — SODIUM CHLORIDE: 0.9 INJECTION, SOLUTION INTRAVENOUS at 08:12

## 2021-12-03 RX ADMIN — TIMOLOL MALEATE 1 DROP: 5 SOLUTION/ DROPS OPHTHALMIC at 08:12

## 2021-12-03 RX ADMIN — ATORVASTATIN CALCIUM 20 MG: 10 TABLET, FILM COATED ORAL at 08:12

## 2021-12-03 RX ADMIN — CARVEDILOL 25 MG: 12.5 TABLET, FILM COATED ORAL at 08:12

## 2021-12-03 RX ADMIN — IPRATROPIUM BROMIDE AND ALBUTEROL SULFATE 3 ML: .5; 3 SOLUTION RESPIRATORY (INHALATION) at 07:12

## 2021-12-03 RX ADMIN — FERROUS SULFATE TAB 325 MG (65 MG ELEMENTAL FE) 1 EACH: 325 (65 FE) TAB at 04:12

## 2021-12-03 RX ADMIN — PANTOPRAZOLE SODIUM 40 MG: 40 TABLET, DELAYED RELEASE ORAL at 08:12

## 2021-12-03 RX ADMIN — IPRATROPIUM BROMIDE AND ALBUTEROL SULFATE 3 ML: .5; 3 SOLUTION RESPIRATORY (INHALATION) at 08:12

## 2021-12-03 RX ADMIN — IPRATROPIUM BROMIDE AND ALBUTEROL SULFATE 3 ML: .5; 3 SOLUTION RESPIRATORY (INHALATION) at 01:12

## 2021-12-03 RX ADMIN — PROMETHAZINE HYDROCHLORIDE AND CODEINE PHOSPHATE 5 ML: 10; 6.25 SOLUTION ORAL at 11:12

## 2021-12-03 RX ADMIN — MIRTAZAPINE 15 MG: 15 TABLET, FILM COATED ORAL at 08:12

## 2021-12-03 RX ADMIN — IPRATROPIUM BROMIDE AND ALBUTEROL SULFATE 3 ML: .5; 3 SOLUTION RESPIRATORY (INHALATION) at 02:12

## 2021-12-03 RX ADMIN — GUAIFENESIN AND DEXTROMETHORPHAN 5 ML: 100; 10 SYRUP ORAL at 05:12

## 2021-12-03 RX ADMIN — LATANOPROST 1 DROP: 50 SOLUTION OPHTHALMIC at 08:12

## 2021-12-03 RX ADMIN — AMLODIPINE BESYLATE 10 MG: 5 TABLET ORAL at 08:12

## 2021-12-04 VITALS
OXYGEN SATURATION: 95 % | TEMPERATURE: 98 F | WEIGHT: 157 LBS | SYSTOLIC BLOOD PRESSURE: 139 MMHG | HEIGHT: 63 IN | HEART RATE: 89 BPM | RESPIRATION RATE: 22 BRPM | DIASTOLIC BLOOD PRESSURE: 72 MMHG | BODY MASS INDEX: 27.82 KG/M2

## 2021-12-04 LAB
ALBUMIN SERPL BCP-MCNC: 3.3 G/DL (ref 3.5–5.2)
ALP SERPL-CCNC: 78 U/L (ref 55–135)
ALT SERPL W/O P-5'-P-CCNC: 20 U/L (ref 10–44)
ANION GAP SERPL CALC-SCNC: 11 MMOL/L (ref 8–16)
AST SERPL-CCNC: 24 U/L (ref 10–40)
BASOPHILS # BLD AUTO: 0.01 K/UL (ref 0–0.2)
BASOPHILS NFR BLD: 0.1 % (ref 0–1.9)
BILIRUB SERPL-MCNC: 1 MG/DL (ref 0.1–1)
BUN SERPL-MCNC: 34 MG/DL (ref 8–23)
CALCIUM SERPL-MCNC: 8.4 MG/DL (ref 8.7–10.5)
CHLORIDE SERPL-SCNC: 105 MMOL/L (ref 95–110)
CO2 SERPL-SCNC: 23 MMOL/L (ref 23–29)
CREAT SERPL-MCNC: 1.4 MG/DL (ref 0.5–1.4)
DIFFERENTIAL METHOD: ABNORMAL
EOSINOPHIL # BLD AUTO: 0 K/UL (ref 0–0.5)
EOSINOPHIL NFR BLD: 0.2 % (ref 0–8)
ERYTHROCYTE [DISTWIDTH] IN BLOOD BY AUTOMATED COUNT: 12.3 % (ref 11.5–14.5)
EST. GFR  (AFRICAN AMERICAN): 54 ML/MIN/1.73 M^2
EST. GFR  (NON AFRICAN AMERICAN): 46 ML/MIN/1.73 M^2
GLUCOSE SERPL-MCNC: 82 MG/DL (ref 70–110)
HCT VFR BLD AUTO: 26.5 % (ref 40–54)
HGB BLD-MCNC: 8.8 G/DL (ref 14–18)
IMM GRANULOCYTES # BLD AUTO: 0.09 K/UL (ref 0–0.04)
IMM GRANULOCYTES NFR BLD AUTO: 0.8 % (ref 0–0.5)
LYMPHOCYTES # BLD AUTO: 1.4 K/UL (ref 1–4.8)
LYMPHOCYTES NFR BLD: 12.9 % (ref 18–48)
MAGNESIUM SERPL-MCNC: 1.8 MG/DL (ref 1.6–2.6)
MCH RBC QN AUTO: 34.4 PG (ref 27–31)
MCHC RBC AUTO-ENTMCNC: 33.2 G/DL (ref 32–36)
MCV RBC AUTO: 104 FL (ref 82–98)
MONOCYTES # BLD AUTO: 1.3 K/UL (ref 0.3–1)
MONOCYTES NFR BLD: 11.8 % (ref 4–15)
NEUTROPHILS # BLD AUTO: 8 K/UL (ref 1.8–7.7)
NEUTROPHILS NFR BLD: 74.2 % (ref 38–73)
NRBC BLD-RTO: 0 /100 WBC
PHOSPHATE SERPL-MCNC: 2.8 MG/DL (ref 2.7–4.5)
PLATELET # BLD AUTO: 192 K/UL (ref 150–450)
PMV BLD AUTO: 10.6 FL (ref 9.2–12.9)
POTASSIUM SERPL-SCNC: 3.7 MMOL/L (ref 3.5–5.1)
PROT SERPL-MCNC: 6 G/DL (ref 6–8.4)
RBC # BLD AUTO: 2.56 M/UL (ref 4.6–6.2)
SODIUM SERPL-SCNC: 139 MMOL/L (ref 136–145)
WBC # BLD AUTO: 10.83 K/UL (ref 3.9–12.7)

## 2021-12-04 PROCEDURE — 94761 N-INVAS EAR/PLS OXIMETRY MLT: CPT | Mod: HCWC

## 2021-12-04 PROCEDURE — 27000221 HC OXYGEN, UP TO 24 HOURS: Mod: HCWC

## 2021-12-04 PROCEDURE — 80053 COMPREHEN METABOLIC PANEL: CPT | Mod: HCWC | Performed by: STUDENT IN AN ORGANIZED HEALTH CARE EDUCATION/TRAINING PROGRAM

## 2021-12-04 PROCEDURE — 25000242 PHARM REV CODE 250 ALT 637 W/ HCPCS: Mod: HCWC | Performed by: INTERNAL MEDICINE

## 2021-12-04 PROCEDURE — 83735 ASSAY OF MAGNESIUM: CPT | Mod: HCWC | Performed by: STUDENT IN AN ORGANIZED HEALTH CARE EDUCATION/TRAINING PROGRAM

## 2021-12-04 PROCEDURE — 25000003 PHARM REV CODE 250: Mod: HCWC | Performed by: STUDENT IN AN ORGANIZED HEALTH CARE EDUCATION/TRAINING PROGRAM

## 2021-12-04 PROCEDURE — 85025 COMPLETE CBC W/AUTO DIFF WBC: CPT | Mod: HCWC | Performed by: STUDENT IN AN ORGANIZED HEALTH CARE EDUCATION/TRAINING PROGRAM

## 2021-12-04 PROCEDURE — 25000003 PHARM REV CODE 250: Mod: HCWC | Performed by: INTERNAL MEDICINE

## 2021-12-04 PROCEDURE — 36415 COLL VENOUS BLD VENIPUNCTURE: CPT | Mod: HCWC | Performed by: STUDENT IN AN ORGANIZED HEALTH CARE EDUCATION/TRAINING PROGRAM

## 2021-12-04 PROCEDURE — 94640 AIRWAY INHALATION TREATMENT: CPT | Mod: HCWC

## 2021-12-04 PROCEDURE — 84100 ASSAY OF PHOSPHORUS: CPT | Mod: HCWC | Performed by: STUDENT IN AN ORGANIZED HEALTH CARE EDUCATION/TRAINING PROGRAM

## 2021-12-04 PROCEDURE — 63600175 PHARM REV CODE 636 W HCPCS: Mod: HCWC | Performed by: INTERNAL MEDICINE

## 2021-12-04 RX ORDER — PROMETHAZINE HYDROCHLORIDE AND CODEINE PHOSPHATE 6.25; 1 MG/5ML; MG/5ML
5 SOLUTION ORAL EVERY 4 HOURS PRN
Qty: 118 ML | Refills: 0 | Status: SHIPPED | OUTPATIENT
Start: 2021-12-04 | End: 2021-12-14

## 2021-12-04 RX ORDER — DOXYCYCLINE HYCLATE 100 MG
100 TABLET ORAL EVERY 12 HOURS
Qty: 12 TABLET | Refills: 0 | Status: SHIPPED | OUTPATIENT
Start: 2021-12-04 | End: 2021-12-10

## 2021-12-04 RX ORDER — PREDNISONE 20 MG/1
40 TABLET ORAL DAILY
Qty: 8 TABLET | Refills: 0 | Status: SHIPPED | OUTPATIENT
Start: 2021-12-05 | End: 2021-12-09

## 2021-12-04 RX ORDER — LANOLIN ALCOHOL/MO/W.PET/CERES
1 CREAM (GRAM) TOPICAL DAILY
Qty: 30 TABLET | Refills: 0 | Status: SHIPPED | OUTPATIENT
Start: 2021-12-05

## 2021-12-04 RX ADMIN — FERROUS SULFATE TAB 325 MG (65 MG ELEMENTAL FE) 1 EACH: 325 (65 FE) TAB at 08:12

## 2021-12-04 RX ADMIN — TIMOLOL MALEATE 1 DROP: 5 SOLUTION/ DROPS OPHTHALMIC at 08:12

## 2021-12-04 RX ADMIN — AMLODIPINE BESYLATE 10 MG: 5 TABLET ORAL at 08:12

## 2021-12-04 RX ADMIN — IPRATROPIUM BROMIDE AND ALBUTEROL SULFATE 3 ML: .5; 3 SOLUTION RESPIRATORY (INHALATION) at 02:12

## 2021-12-04 RX ADMIN — PANTOPRAZOLE SODIUM 40 MG: 40 TABLET, DELAYED RELEASE ORAL at 08:12

## 2021-12-04 RX ADMIN — DOXYCYCLINE HYCLATE 100 MG: 100 TABLET, COATED ORAL at 08:12

## 2021-12-04 RX ADMIN — ATORVASTATIN CALCIUM 20 MG: 10 TABLET, FILM COATED ORAL at 08:12

## 2021-12-04 RX ADMIN — IPRATROPIUM BROMIDE AND ALBUTEROL SULFATE 3 ML: .5; 3 SOLUTION RESPIRATORY (INHALATION) at 07:12

## 2021-12-04 RX ADMIN — CARVEDILOL 25 MG: 12.5 TABLET, FILM COATED ORAL at 08:12

## 2021-12-04 RX ADMIN — PREDNISONE 40 MG: 20 TABLET ORAL at 08:12

## 2021-12-07 ENCOUNTER — PATIENT OUTREACH (OUTPATIENT)
Dept: ADMINISTRATIVE | Facility: CLINIC | Age: 81
End: 2021-12-07
Payer: MEDICARE

## 2022-01-15 ENCOUNTER — HOSPITAL ENCOUNTER (OUTPATIENT)
Facility: HOSPITAL | Age: 82
LOS: 2 days | Discharge: HOME | End: 2022-01-17
Attending: EMERGENCY MEDICINE | Admitting: INTERNAL MEDICINE
Payer: MEDICARE

## 2022-01-15 DIAGNOSIS — R09.02 HYPOXIA: ICD-10-CM

## 2022-01-15 DIAGNOSIS — J18.9 MULTIFOCAL PNEUMONIA: ICD-10-CM

## 2022-01-15 DIAGNOSIS — I21.4 NSTEMI (NON-ST ELEVATED MYOCARDIAL INFARCTION): Primary | ICD-10-CM

## 2022-01-15 DIAGNOSIS — U07.1 COVID-19 VIRUS INFECTION: ICD-10-CM

## 2022-01-15 DIAGNOSIS — U07.1 COVID-19: ICD-10-CM

## 2022-01-15 DIAGNOSIS — R05.9 COUGH: ICD-10-CM

## 2022-01-15 PROBLEM — R31.9 HEMATURIA: Status: ACTIVE | Noted: 2022-01-15

## 2022-01-15 PROBLEM — T14.8XXA HEMATOMA: Status: ACTIVE | Noted: 2022-01-15

## 2022-01-15 LAB
ALBUMIN SERPL BCP-MCNC: 3.7 G/DL (ref 3.5–5.2)
ALP SERPL-CCNC: 74 U/L (ref 55–135)
ALT SERPL W/O P-5'-P-CCNC: 32 U/L (ref 10–44)
ANION GAP SERPL CALC-SCNC: 14 MMOL/L (ref 8–16)
APTT BLDCRRT: 37.5 SEC (ref 21–32)
AST SERPL-CCNC: 40 U/L (ref 10–40)
BASOPHILS # BLD AUTO: 0.01 K/UL (ref 0–0.2)
BASOPHILS NFR BLD: 0.1 % (ref 0–1.9)
BILIRUB SERPL-MCNC: 0.8 MG/DL (ref 0.1–1)
BILIRUB UR QL STRIP: NEGATIVE
BNP SERPL-MCNC: 17 PG/ML (ref 0–99)
BUN SERPL-MCNC: 36 MG/DL (ref 8–23)
CALCIUM SERPL-MCNC: 8.8 MG/DL (ref 8.7–10.5)
CHLORIDE SERPL-SCNC: 100 MMOL/L (ref 95–110)
CK SERPL-CCNC: 101 U/L (ref 20–200)
CLARITY UR: CLEAR
CO2 SERPL-SCNC: 22 MMOL/L (ref 23–29)
COLOR UR: YELLOW
CREAT SERPL-MCNC: 1.5 MG/DL (ref 0.5–1.4)
CRP SERPL-MCNC: 16.1 MG/L (ref 0–8.2)
CTP QC/QA: YES
D DIMER PPP IA.FEU-MCNC: 0.96 MG/L FEU
DIFFERENTIAL METHOD: ABNORMAL
EOSINOPHIL # BLD AUTO: 0 K/UL (ref 0–0.5)
EOSINOPHIL NFR BLD: 0 % (ref 0–8)
ERYTHROCYTE [DISTWIDTH] IN BLOOD BY AUTOMATED COUNT: 12.4 % (ref 11.5–14.5)
ERYTHROCYTE [SEDIMENTATION RATE] IN BLOOD BY WESTERGREN METHOD: 39 MM/HR (ref 0–10)
EST. GFR  (AFRICAN AMERICAN): 49 ML/MIN/1.73 M^2
EST. GFR  (NON AFRICAN AMERICAN): 43 ML/MIN/1.73 M^2
FERRITIN SERPL-MCNC: 1447 NG/ML (ref 20–300)
GLUCOSE SERPL-MCNC: 99 MG/DL (ref 70–110)
GLUCOSE UR QL STRIP: NEGATIVE
HCT VFR BLD AUTO: 33.7 % (ref 40–54)
HGB BLD-MCNC: 11.6 G/DL (ref 14–18)
HGB UR QL STRIP: ABNORMAL
IMM GRANULOCYTES # BLD AUTO: 0.04 K/UL (ref 0–0.04)
IMM GRANULOCYTES NFR BLD AUTO: 0.6 % (ref 0–0.5)
INR PPP: 1 (ref 0.8–1.2)
KETONES UR QL STRIP: ABNORMAL
LACTATE SERPL-SCNC: 0.7 MMOL/L (ref 0.5–2.2)
LDH SERPL L TO P-CCNC: 258 U/L (ref 110–260)
LEUKOCYTE ESTERASE UR QL STRIP: NEGATIVE
LYMPHOCYTES # BLD AUTO: 1.3 K/UL (ref 1–4.8)
LYMPHOCYTES NFR BLD: 19.6 % (ref 18–48)
MAGNESIUM SERPL-MCNC: 1.5 MG/DL (ref 1.6–2.6)
MCH RBC QN AUTO: 34.2 PG (ref 27–31)
MCHC RBC AUTO-ENTMCNC: 34.4 G/DL (ref 32–36)
MCV RBC AUTO: 99 FL (ref 82–98)
MICROSCOPIC COMMENT: ABNORMAL
MONOCYTES # BLD AUTO: 0.7 K/UL (ref 0.3–1)
MONOCYTES NFR BLD: 9.9 % (ref 4–15)
NEUTROPHILS # BLD AUTO: 4.7 K/UL (ref 1.8–7.7)
NEUTROPHILS NFR BLD: 69.8 % (ref 38–73)
NITRITE UR QL STRIP: NEGATIVE
NRBC BLD-RTO: 0 /100 WBC
PH UR STRIP: 6 [PH] (ref 5–8)
PLATELET # BLD AUTO: 151 K/UL (ref 150–450)
PMV BLD AUTO: 10.8 FL (ref 9.2–12.9)
POTASSIUM SERPL-SCNC: 3.5 MMOL/L (ref 3.5–5.1)
PROCALCITONIN SERPL IA-MCNC: 0.6 NG/ML
PROT SERPL-MCNC: 7.4 G/DL (ref 6–8.4)
PROT UR QL STRIP: ABNORMAL
PROTHROMBIN TIME: 11 SEC (ref 9–12.5)
RBC # BLD AUTO: 3.39 M/UL (ref 4.6–6.2)
RBC #/AREA URNS HPF: 12 /HPF (ref 0–4)
SARS-COV-2 RDRP RESP QL NAA+PROBE: POSITIVE
SODIUM SERPL-SCNC: 136 MMOL/L (ref 136–145)
SP GR UR STRIP: 1.02 (ref 1–1.03)
TROPONIN I SERPL DL<=0.01 NG/ML-MCNC: 0.25 NG/ML (ref 0–0.03)
TROPONIN I SERPL DL<=0.01 NG/ML-MCNC: 0.27 NG/ML (ref 0–0.03)
URN SPEC COLLECT METH UR: ABNORMAL
UROBILINOGEN UR STRIP-ACNC: NEGATIVE EU/DL
WBC # BLD AUTO: 6.74 K/UL (ref 3.9–12.7)

## 2022-01-15 PROCEDURE — 83880 ASSAY OF NATRIURETIC PEPTIDE: CPT | Performed by: PHYSICIAN ASSISTANT

## 2022-01-15 PROCEDURE — 21400001 HC TELEMETRY ROOM

## 2022-01-15 PROCEDURE — 83605 ASSAY OF LACTIC ACID: CPT | Performed by: HOSPITALIST

## 2022-01-15 PROCEDURE — 96372 THER/PROPH/DIAG INJ SC/IM: CPT | Mod: 59

## 2022-01-15 PROCEDURE — 99285 EMERGENCY DEPT VISIT HI MDM: CPT | Mod: 25

## 2022-01-15 PROCEDURE — 96375 TX/PRO/DX INJ NEW DRUG ADDON: CPT | Mod: 59

## 2022-01-15 PROCEDURE — 86140 C-REACTIVE PROTEIN: CPT | Performed by: PHYSICIAN ASSISTANT

## 2022-01-15 PROCEDURE — 25000003 PHARM REV CODE 250: Performed by: PHYSICIAN ASSISTANT

## 2022-01-15 PROCEDURE — 82550 ASSAY OF CK (CPK): CPT | Performed by: HOSPITALIST

## 2022-01-15 PROCEDURE — 82728 ASSAY OF FERRITIN: CPT | Performed by: HOSPITALIST

## 2022-01-15 PROCEDURE — 36415 COLL VENOUS BLD VENIPUNCTURE: CPT | Performed by: HOSPITALIST

## 2022-01-15 PROCEDURE — U0002 COVID-19 LAB TEST NON-CDC: HCPCS | Performed by: EMERGENCY MEDICINE

## 2022-01-15 PROCEDURE — 80053 COMPREHEN METABOLIC PANEL: CPT | Performed by: PHYSICIAN ASSISTANT

## 2022-01-15 PROCEDURE — 96365 THER/PROPH/DIAG IV INF INIT: CPT

## 2022-01-15 PROCEDURE — 85730 THROMBOPLASTIN TIME PARTIAL: CPT | Performed by: HOSPITALIST

## 2022-01-15 PROCEDURE — 83735 ASSAY OF MAGNESIUM: CPT | Performed by: HOSPITALIST

## 2022-01-15 PROCEDURE — 84145 PROCALCITONIN (PCT): CPT | Performed by: PHYSICIAN ASSISTANT

## 2022-01-15 PROCEDURE — 93005 ELECTROCARDIOGRAM TRACING: CPT

## 2022-01-15 PROCEDURE — 93010 EKG 12-LEAD: ICD-10-PCS | Mod: ,,, | Performed by: INTERNAL MEDICINE

## 2022-01-15 PROCEDURE — 85025 COMPLETE CBC W/AUTO DIFF WBC: CPT | Performed by: PHYSICIAN ASSISTANT

## 2022-01-15 PROCEDURE — 81000 URINALYSIS NONAUTO W/SCOPE: CPT | Performed by: PHYSICIAN ASSISTANT

## 2022-01-15 PROCEDURE — 93010 ELECTROCARDIOGRAM REPORT: CPT | Mod: ,,, | Performed by: INTERNAL MEDICINE

## 2022-01-15 PROCEDURE — 85379 FIBRIN DEGRADATION QUANT: CPT | Performed by: HOSPITALIST

## 2022-01-15 PROCEDURE — 84484 ASSAY OF TROPONIN QUANT: CPT | Mod: 91 | Performed by: PHYSICIAN ASSISTANT

## 2022-01-15 PROCEDURE — 27000207 HC ISOLATION

## 2022-01-15 PROCEDURE — 63600175 PHARM REV CODE 636 W HCPCS: Performed by: PHYSICIAN ASSISTANT

## 2022-01-15 PROCEDURE — 85652 RBC SED RATE AUTOMATED: CPT | Performed by: HOSPITALIST

## 2022-01-15 PROCEDURE — 25000003 PHARM REV CODE 250: Performed by: HOSPITALIST

## 2022-01-15 PROCEDURE — 63600175 PHARM REV CODE 636 W HCPCS: Performed by: HOSPITALIST

## 2022-01-15 PROCEDURE — 85610 PROTHROMBIN TIME: CPT | Performed by: HOSPITALIST

## 2022-01-15 PROCEDURE — 83615 LACTATE (LD) (LDH) ENZYME: CPT | Performed by: HOSPITALIST

## 2022-01-15 RX ORDER — DEXAMETHASONE SODIUM PHOSPHATE 4 MG/ML
8 INJECTION, SOLUTION INTRA-ARTICULAR; INTRALESIONAL; INTRAMUSCULAR; INTRAVENOUS; SOFT TISSUE
Status: COMPLETED | OUTPATIENT
Start: 2022-01-15 | End: 2022-01-15

## 2022-01-15 RX ORDER — LISINOPRIL 20 MG/1
20 TABLET ORAL DAILY
Status: DISCONTINUED | OUTPATIENT
Start: 2022-01-16 | End: 2022-01-17 | Stop reason: HOSPADM

## 2022-01-15 RX ORDER — ASPIRIN 325 MG
325 TABLET ORAL
Status: COMPLETED | OUTPATIENT
Start: 2022-01-15 | End: 2022-01-15

## 2022-01-15 RX ORDER — GLUCAGON 1 MG
1 KIT INJECTION
Status: DISCONTINUED | OUTPATIENT
Start: 2022-01-15 | End: 2022-01-17 | Stop reason: HOSPADM

## 2022-01-15 RX ORDER — POTASSIUM CHLORIDE 20 MEQ/1
40 TABLET, EXTENDED RELEASE ORAL ONCE
Status: COMPLETED | OUTPATIENT
Start: 2022-01-15 | End: 2022-01-15

## 2022-01-15 RX ORDER — ASCORBIC ACID 500 MG
500 TABLET ORAL 2 TIMES DAILY
Status: DISCONTINUED | OUTPATIENT
Start: 2022-01-15 | End: 2022-01-17 | Stop reason: HOSPADM

## 2022-01-15 RX ORDER — SODIUM CHLORIDE 0.9 % (FLUSH) 0.9 %
10 SYRINGE (ML) INJECTION
Status: DISCONTINUED | OUTPATIENT
Start: 2022-01-15 | End: 2022-01-17 | Stop reason: HOSPADM

## 2022-01-15 RX ORDER — ENOXAPARIN SODIUM 100 MG/ML
1 INJECTION SUBCUTANEOUS
Status: DISCONTINUED | OUTPATIENT
Start: 2022-01-15 | End: 2022-01-15

## 2022-01-15 RX ORDER — HYDROCHLOROTHIAZIDE 25 MG/1
25 TABLET ORAL DAILY
Status: DISCONTINUED | OUTPATIENT
Start: 2022-01-16 | End: 2022-01-17 | Stop reason: HOSPADM

## 2022-01-15 RX ORDER — HEPARIN SODIUM 5000 [USP'U]/ML
5000 INJECTION, SOLUTION INTRAVENOUS; SUBCUTANEOUS EVERY 8 HOURS
Status: DISCONTINUED | OUTPATIENT
Start: 2022-01-15 | End: 2022-01-17 | Stop reason: HOSPADM

## 2022-01-15 RX ORDER — NAPROXEN SODIUM 220 MG/1
81 TABLET, FILM COATED ORAL DAILY
Status: DISCONTINUED | OUTPATIENT
Start: 2022-01-16 | End: 2022-01-17 | Stop reason: HOSPADM

## 2022-01-15 RX ORDER — ALBUTEROL SULFATE 90 UG/1
2 AEROSOL, METERED RESPIRATORY (INHALATION) EVERY 6 HOURS
Status: DISCONTINUED | OUTPATIENT
Start: 2022-01-16 | End: 2022-01-16

## 2022-01-15 RX ORDER — ATORVASTATIN CALCIUM 40 MG/1
80 TABLET, FILM COATED ORAL DAILY
Status: DISCONTINUED | OUTPATIENT
Start: 2022-01-15 | End: 2022-01-17 | Stop reason: HOSPADM

## 2022-01-15 RX ORDER — IBUPROFEN 200 MG
16 TABLET ORAL
Status: DISCONTINUED | OUTPATIENT
Start: 2022-01-15 | End: 2022-01-17 | Stop reason: HOSPADM

## 2022-01-15 RX ORDER — CARVEDILOL 12.5 MG/1
25 TABLET ORAL 2 TIMES DAILY WITH MEALS
Status: DISCONTINUED | OUTPATIENT
Start: 2022-01-16 | End: 2022-01-17 | Stop reason: HOSPADM

## 2022-01-15 RX ORDER — IBUPROFEN 200 MG
24 TABLET ORAL
Status: DISCONTINUED | OUTPATIENT
Start: 2022-01-15 | End: 2022-01-17 | Stop reason: HOSPADM

## 2022-01-15 RX ADMIN — ASPIRIN 325 MG ORAL TABLET 325 MG: 325 PILL ORAL at 06:01

## 2022-01-15 RX ADMIN — ENOXAPARIN SODIUM 70 MG: 100 INJECTION SUBCUTANEOUS at 06:01

## 2022-01-15 RX ADMIN — OXYCODONE HYDROCHLORIDE AND ACETAMINOPHEN 500 MG: 500 TABLET ORAL at 10:01

## 2022-01-15 RX ADMIN — ATORVASTATIN CALCIUM 80 MG: 40 TABLET, FILM COATED ORAL at 10:01

## 2022-01-15 RX ADMIN — CEFTRIAXONE 1 G: 1 INJECTION, SOLUTION INTRAVENOUS at 05:01

## 2022-01-15 RX ADMIN — POTASSIUM CHLORIDE 40 MEQ: 1500 TABLET, EXTENDED RELEASE ORAL at 10:01

## 2022-01-15 RX ADMIN — DEXAMETHASONE SODIUM PHOSPHATE 8 MG: 4 INJECTION INTRA-ARTICULAR; INTRALESIONAL; INTRAMUSCULAR; INTRAVENOUS; SOFT TISSUE at 05:01

## 2022-01-15 RX ADMIN — REMDESIVIR 200 MG: 100 INJECTION, POWDER, LYOPHILIZED, FOR SOLUTION INTRAVENOUS at 10:01

## 2022-01-15 NOTE — ED PROVIDER NOTES
Encounter Date: 1/15/2022       History     Chief Complaint   Patient presents with    COVID-19 Concerns     Pt to ER with c/o fatigue, cough, since monday. Pt sent from  for + COVID test      79-year-old male with history of hypertension presents to the emergency department for persistent cough for 5 days.  He went to an urgent care today and tested positive for COVID-19 and was advised to come to the ED secondary to his age.  He has no other complaints or concerns, including for chest pain, shortness of breath, nausea, vomiting, fever, and abdominal pain.  No medications taken prior to arrival.    The history is provided by the patient. The history is limited by a language barrier. A  was used.     Review of patient's allergies indicates:  No Known Allergies  Past Medical History:   Diagnosis Date    BPH (benign prostatic hyperplasia)     Hyperlipidemia     Hypertension     Hyperthyroidism      History reviewed. No pertinent surgical history.  History reviewed. No pertinent family history.  Social History     Tobacco Use    Smoking status: Never Smoker    Smokeless tobacco: Never Used   Substance Use Topics    Alcohol use: No    Drug use: No     Review of Systems   Constitutional: Negative for fever.   HENT: Negative for congestion, sore throat and trouble swallowing.    Respiratory: Positive for cough. Negative for shortness of breath.    Cardiovascular: Negative for chest pain.   Gastrointestinal: Negative for abdominal pain, constipation, diarrhea, nausea and vomiting.   Genitourinary: Negative for dysuria, flank pain, frequency and urgency.   Musculoskeletal: Negative for back pain.   Skin: Negative for rash.   Neurological: Negative for headaches.   All other systems reviewed and are negative.      Physical Exam     Initial Vitals   BP Pulse Resp Temp SpO2   01/15/22 1424 01/15/22 1424 01/15/22 1424 01/15/22 1459 01/15/22 1424   (!) 144/70 92 20 98.7 °F (37.1 °C) 96 %      MAP        --                Physical Exam    Nursing note and vitals reviewed.  Constitutional: He appears well-developed and well-nourished. He is not diaphoretic. No distress.   HENT:   Head: Normocephalic and atraumatic.   Nose: Nose normal.   Eyes: Conjunctivae and EOM are normal. Pupils are equal, round, and reactive to light. Right eye exhibits no discharge. Left eye exhibits no discharge.   Neck: No tracheal deviation present. No JVD present.   Normal range of motion.  Cardiovascular: Normal rate, regular rhythm and normal heart sounds. Exam reveals no friction rub.    No murmur heard.  Pulmonary/Chest: Breath sounds normal. No stridor. No respiratory distress. He has no wheezes. He has no rhonchi. He has no rales. He exhibits no tenderness.   Mild tachypnea only with exertion.   Musculoskeletal:         General: Normal range of motion.      Cervical back: Normal range of motion.      Comments: No lower extremity swelling or TTP.      Neurological: He is alert and oriented to person, place, and time.   Skin: Skin is warm and dry. No rash and no abscess noted. No erythema. No pallor.         ED Course   Procedures  Labs Reviewed   CBC W/ AUTO DIFFERENTIAL - Abnormal; Notable for the following components:       Result Value    RBC 3.39 (*)     Hemoglobin 11.6 (*)     Hematocrit 33.7 (*)     MCV 99 (*)     MCH 34.2 (*)     Immature Granulocytes 0.6 (*)     All other components within normal limits   COMPREHENSIVE METABOLIC PANEL - Abnormal; Notable for the following components:    CO2 22 (*)     BUN 36 (*)     Creatinine 1.5 (*)     eGFR if  49 (*)     eGFR if non  43 (*)     All other components within normal limits   C-REACTIVE PROTEIN - Abnormal; Notable for the following components:    CRP 16.1 (*)     All other components within normal limits   PROCALCITONIN - Abnormal; Notable for the following components:    Procalcitonin 0.60 (*)     All other components within normal limits    URINALYSIS, REFLEX TO URINE CULTURE - Abnormal; Notable for the following components:    Protein, UA Trace (*)     Ketones, UA Trace (*)     Occult Blood UA 1+ (*)     All other components within normal limits    Narrative:     Specimen Source->Urine   TROPONIN I - Abnormal; Notable for the following components:    Troponin I 0.270 (*)     All other components within normal limits   TROPONIN I - Abnormal; Notable for the following components:    Troponin I 0.254 (*)     All other components within normal limits   URINALYSIS MICROSCOPIC - Abnormal; Notable for the following components:    RBC, UA 12 (*)     All other components within normal limits    Narrative:     Specimen Source->Urine   SARS-COV-2 RDRP GENE - Abnormal; Notable for the following components:    POC Rapid COVID Positive (*)     All other components within normal limits    Narrative:     This test utilizes isothermal nucleic acid amplification   technology to detect the SARS-CoV-2 RdRp nucleic acid segment.   The analytical sensitivity (limit of detection) is 125 genome   equivalents/mL.   A POSITIVE result implies infection with the SARS-CoV-2 virus;   the patient is presumed to be contagious.     A NEGATIVE result means that SARS-CoV-2 nucleic acids are not   present above the limit of detection. A NEGATIVE result should be   treated as presumptive. It does not rule out the possibility of   COVID-19 and should not be the sole basis for treatment decisions.   If COVID-19 is strongly suspected based on clinical and exposure   history, re-testing using an alternate molecular assay should be   considered.   This test is only for use under the Food and Drug   Administration s Emergency Use Authorization (EUA).   Commercial kits are provided by [employer].   Performance characteristics of the EUA have been independently   verified by [facility] Department of   Pathology and Laboratory Medicine.    _________________________________________________________________   The authorized Fact Sheet for Healthcare Providers and the authorized Fact   Sheet for Patients of the ID NOW COVID-19 are available on the FDA   website:     https://www.fda.gov/media/426782/download  https://www.fda.gov/media/320228/download       B-TYPE NATRIURETIC PEPTIDE        ECG Results          EKG 12-lead (Final result)  Result time 01/17/22 14:22:08    Final result by Interface, Lab In [facility] (01/17/22 14:22:08)                 Narrative:    Test Reason : R05.9,    Vent. Rate : 089 BPM     Atrial Rate : 089 BPM     P-R Int : 164 ms          QRS Dur : 088 ms      QT Int : 370 ms       P-R-T Axes : 036 -01 024 degrees     QTc Int : 450 ms    Normal sinus rhythm  Normal ECG  When compared with ECG of 01-DEC-2021 11:13,  Significant changes have occurred  Confirmed by Carlo Caldwell MD ([id number]) on 1/17/2022 2:22:01 PM    Referred By: [email or web address]   SELF           Confirmed By:Carlo Caldwell MD                            Imaging Results          CTA Chest Non-Coronary (PE Study) (Final result)  Result time 01/15/22 19:43:11    Final result by Sara Black MD (01/15/22 19:43:11)                 Impression:      1. No evidence of PE.  2. Mild patchy ground-glass densities in the right lung.  This may in part relate to respiratory motion artifact, however findings could reflect mild multifocal infectious or inflammatory process.  No focal consolidation.      Electronically signed by: Sara Black MD  Date:    01/15/2022  Time:    19:43             Narrative:    EXAMINATION:  CTA CHEST NON CORONARY    CLINICAL HISTORY:  Pulmonary embolism (PE) suspected, unknown D-dimer;    TECHNIQUE:  Low dose axial images, sagittal and coronal reformations were obtained from the thoracic inlet to the lung bases following the IV administration of 65 mL of Omnipaque 350.  Contrast timing was optimized to evaluate the pulmonary arteries.  MIP images were  performed.    COMPARISON:  CTA chest 12/01/2021.    FINDINGS:  Structures at the base of the neck are unremarkable.  Aorta is non-aneurysmal.  The heart is normal in size without pericardial effusion.  Mild aortic atherosclerosis and coronary artery calcification are seen.  No intraluminal filling defects within the pulmonary arteries to suggest pulmonary thromboembolism.   There is no evidence of mediastinal, axillary, or hilar lymph node enlargement.  The esophagus is unremarkable along its course.    The trachea and bronchi are patent.  The lungs are symmetrically expanded.  Mild patchy ground-glass densities are seen the right upper, middle, and lower lobes, noting respiratory motion artifact seen.  No focal consolidation.  No pleural effusion.  No evidence of pulmonary hemorrhage or infarction.    The visualized abdominal structures are unremarkable.  No acute osseous abnormality identified.  Extrathoracic soft tissues are unremarkable.                               X-Ray Chest AP Portable (Final result)  Result time 01/15/22 15:59:44    Final result by Susy De Dios MD (01/15/22 15:59:44)                 Impression:      As above      Electronically signed by: Susy De Dios MD  Date:    01/15/2022  Time:    15:59             Narrative:    EXAMINATION:  XR CHEST AP PORTABLE    CLINICAL HISTORY:  COVID-19    TECHNIQUE:  Single frontal view of the chest was performed.    COMPARISON:  December 1, 2021    FINDINGS:  Heart size is not enlarged.  There is calcification along the wall of the aorta.  There is no pleural effusion.  The osseous structures show mild degenerative change.  The upper lungs demonstrate no focal consolidation.  At the lung bases bilaterally there is mild opacity which may represent atelectasis or developing infection.                                 Medications   cefTRIAXone (ROCEPHIN) 1 g/50 mL D5W IVPB (0 g Intravenous Stopped 1/15/22 1905)   dexamethasone injection 8 mg (8 mg  Intravenous Given 1/15/22 1739)   aspirin tablet 325 mg (325 mg Oral Given 1/15/22 1845)   remdesivir 200 mg in sodium chloride 0.9% 250 mL infusion (0 mg Intravenous Stopped 1/15/22 2339)   potassium chloride SA CR tablet 40 mEq (40 mEq Oral Given 1/15/22 2220)     Medical Decision Making:   History:   Old Medical Records: I decided to obtain old medical records.  ED Management:  Pt's friend that serves as caregiver:  Zelalem [phone].     COVID pneumonia with mild hypoxia that is most prominent with exertion.  Incidental elevation of troponin without STEMI.  Will add CTA of chest for possible pulmonary embolism as source of troponin elevation.  Remainder of workup overall unremarkable.  Case discussed with cardiologist, Dr. Caldwell, who will follow along with case. Will start full dose Lovenox. Pt agreeable to plan. An  has been used for our conversations.    Other:   I have discussed this case with another health care provider.            Attending Attestation:         Attending Critical Care:   Critical Care Times:   Direct Patient Care (initial evaluation, reassessments, and time considering the case)................................................................10 minutes.   Additional History from reviewing old medical records or taking additional history from the family, EMS, PCP, etc.......................9 minutes.   Ordering, Reviewing, and Interpreting Diagnostic Studies...............................................................................................................6 minutes.   Documentation..................................................................................................................................................................................7 minutes.   Consultation with other Physicians. .................................................................................................................................................6 minutes.    Consultation with the patient's family directly relating to the patient's condition, care, and DNR status (when patient unable)......4 minutes.   Other..................................................................................................................................................................................................0 minutes.   ==============================================================  · Total Critical Care Time - exclusive of procedural time: 42 minutes.  ==============================================================  Critical care reasons: hypoxia and NSTEMI.   The following critical care procedures were done by me (see procedure notes): pulse oximetry.   Critical care was time spent personally by me on the following activities: obtaining history from patient or relative, examination of patient, review of x-rays / CT sent with the patient, review of old charts, ordering lab, x-rays, and/or EKG, development of treatment plan with patient or relative, ordering and performing treatments and interventions, evaluation of patient's response to treatment, discussion with consultants and re-evaluation of patient's conition.   Critical Care Condition: potentially life-threatening                    Clinical Impression:   Final diagnoses:  [U07.1] COVID-19 virus infection  [R05.9] Cough  [I21.4] NSTEMI (non-ST elevated myocardial infarction) (Primary)  [R09.02] Hypoxia  [J18.9] Multifocal pneumonia          ED Disposition Condition    Admit               Jian Etienne PA-C  01/15/22 1946       Jian Etienne PA-C  03/04/22 1412

## 2022-01-16 PROBLEM — R79.89 ELEVATED TROPONIN: Status: ACTIVE | Noted: 2022-01-15

## 2022-01-16 LAB
AORTIC ROOT ANNULUS: 3.73 CM
AORTIC VALVE CUSP SEPERATION: 2.3 CM
ASCENDING AORTA: 3.88 CM
AV INDEX (PROSTH): 0.71
AV MEAN GRADIENT: 3 MMHG
AV PEAK GRADIENT: 5 MMHG
AV VALVE AREA: 3.2 CM2
AV VELOCITY RATIO: 0.7
BASOPHILS # BLD AUTO: 0 K/UL (ref 0–0.2)
BASOPHILS NFR BLD: 0 % (ref 0–1.9)
BSA FOR ECHO PROCEDURE: 1.68 M2
CV ECHO LV RWT: 0.75 CM
DIFFERENTIAL METHOD: ABNORMAL
DOP CALC AO PEAK VEL: 1.11 M/S
DOP CALC AO VTI: 22.45 CM
DOP CALC LVOT AREA: 4.5 CM2
DOP CALC LVOT DIAMETER: 2.39 CM
DOP CALC LVOT PEAK VEL: 0.78 M/S
DOP CALC LVOT STROKE VOLUME: 71.83 CM3
DOP CALCLVOT PEAK VEL VTI: 16.02 CM
E WAVE DECELERATION TIME: 113.98 MSEC
E/A RATIO: 0.52
E/E' RATIO: 9.56 M/S
ECHO LV POSTERIOR WALL: 1.25 CM (ref 0.6–1.1)
EJECTION FRACTION: 70 %
EOSINOPHIL # BLD AUTO: 0 K/UL (ref 0–0.5)
EOSINOPHIL NFR BLD: 0 % (ref 0–8)
ERYTHROCYTE [DISTWIDTH] IN BLOOD BY AUTOMATED COUNT: 12.3 % (ref 11.5–14.5)
FRACTIONAL SHORTENING: 36 % (ref 28–44)
HCT VFR BLD AUTO: 34 % (ref 40–54)
HGB BLD-MCNC: 11.7 G/DL (ref 14–18)
IMM GRANULOCYTES # BLD AUTO: 0.01 K/UL (ref 0–0.04)
IMM GRANULOCYTES NFR BLD AUTO: 0.3 % (ref 0–0.5)
INTERVENTRICULAR SEPTUM: 1.25 CM (ref 0.6–1.1)
IVRT: 197.91 MSEC
LA MAJOR: 4.68 CM
LA MINOR: 4.84 CM
LA WIDTH: 3.43 CM
LEFT ATRIUM SIZE: 3.67 CM
LEFT ATRIUM VOLUME INDEX: 30.7 ML/M2
LEFT ATRIUM VOLUME: 50.92 CM3
LEFT INTERNAL DIMENSION IN SYSTOLE: 2.11 CM (ref 2.1–4)
LEFT VENTRICLE DIASTOLIC VOLUME INDEX: 27 ML/M2
LEFT VENTRICLE DIASTOLIC VOLUME: 44.82 ML
LEFT VENTRICLE MASS INDEX: 81 G/M2
LEFT VENTRICLE SYSTOLIC VOLUME INDEX: 8.7 ML/M2
LEFT VENTRICLE SYSTOLIC VOLUME: 14.52 ML
LEFT VENTRICULAR INTERNAL DIMENSION IN DIASTOLE: 3.32 CM (ref 3.5–6)
LEFT VENTRICULAR MASS: 134.17 G
LV LATERAL E/E' RATIO: 8.6 M/S
LV SEPTAL E/E' RATIO: 10.75 M/S
LYMPHOCYTES # BLD AUTO: 0.8 K/UL (ref 1–4.8)
LYMPHOCYTES NFR BLD: 21.6 % (ref 18–48)
MCH RBC QN AUTO: 33.8 PG (ref 27–31)
MCHC RBC AUTO-ENTMCNC: 34.4 G/DL (ref 32–36)
MCV RBC AUTO: 98 FL (ref 82–98)
MONOCYTES # BLD AUTO: 0.1 K/UL (ref 0.3–1)
MONOCYTES NFR BLD: 3.5 % (ref 4–15)
MV PEAK A VEL: 0.83 M/S
MV PEAK E VEL: 0.43 M/S
MV STENOSIS PRESSURE HALF TIME: 33.05 MS
MV VALVE AREA P 1/2 METHOD: 6.66 CM2
NEUTROPHILS # BLD AUTO: 2.8 K/UL (ref 1.8–7.7)
NEUTROPHILS NFR BLD: 74.6 % (ref 38–73)
NRBC BLD-RTO: 0 /100 WBC
PISA TR MAX VEL: 2.47 M/S
PLATELET # BLD AUTO: 138 K/UL (ref 150–450)
PMV BLD AUTO: 10.2 FL (ref 9.2–12.9)
PV PEAK VELOCITY: 0.92 CM/S
RA MAJOR: 4.95 CM
RA WIDTH: 3.64 CM
RBC # BLD AUTO: 3.46 M/UL (ref 4.6–6.2)
RIGHT VENTRICULAR END-DIASTOLIC DIMENSION: 3.61 CM
STJ: 3.47 CM
TDI LATERAL: 0.05 M/S
TDI SEPTAL: 0.04 M/S
TDI: 0.05 M/S
TR MAX PG: 24 MMHG
TRICUSPID ANNULAR PLANE SYSTOLIC EXCURSION: 1.81 CM
TROPONIN I SERPL DL<=0.01 NG/ML-MCNC: 0.26 NG/ML (ref 0–0.03)
WBC # BLD AUTO: 3.7 K/UL (ref 3.9–12.7)

## 2022-01-16 PROCEDURE — 25000003 PHARM REV CODE 250: Performed by: HOSPITALIST

## 2022-01-16 PROCEDURE — 85025 COMPLETE CBC W/AUTO DIFF WBC: CPT | Performed by: HOSPITALIST

## 2022-01-16 PROCEDURE — 27100098 HC SPACER

## 2022-01-16 PROCEDURE — 36415 COLL VENOUS BLD VENIPUNCTURE: CPT | Performed by: HOSPITALIST

## 2022-01-16 PROCEDURE — 25000242 PHARM REV CODE 250 ALT 637 W/ HCPCS: Performed by: HOSPITALIST

## 2022-01-16 PROCEDURE — 94640 AIRWAY INHALATION TREATMENT: CPT

## 2022-01-16 PROCEDURE — 63600175 PHARM REV CODE 636 W HCPCS: Performed by: HOSPITALIST

## 2022-01-16 PROCEDURE — 94760 N-INVAS EAR/PLS OXIMETRY 1: CPT

## 2022-01-16 PROCEDURE — 27000207 HC ISOLATION

## 2022-01-16 PROCEDURE — 84484 ASSAY OF TROPONIN QUANT: CPT | Performed by: HOSPITALIST

## 2022-01-16 PROCEDURE — 21400001 HC TELEMETRY ROOM

## 2022-01-16 RX ORDER — BENZONATATE 100 MG/1
200 CAPSULE ORAL 3 TIMES DAILY PRN
Status: DISCONTINUED | OUTPATIENT
Start: 2022-01-16 | End: 2022-01-17 | Stop reason: HOSPADM

## 2022-01-16 RX ORDER — ONDANSETRON 2 MG/ML
8 INJECTION INTRAMUSCULAR; INTRAVENOUS EVERY 6 HOURS PRN
Status: DISCONTINUED | OUTPATIENT
Start: 2022-01-16 | End: 2022-01-17 | Stop reason: HOSPADM

## 2022-01-16 RX ORDER — ALBUTEROL SULFATE 90 UG/1
2 AEROSOL, METERED RESPIRATORY (INHALATION) EVERY 6 HOURS
Status: DISCONTINUED | OUTPATIENT
Start: 2022-01-16 | End: 2022-01-16 | Stop reason: DRUGHIGH

## 2022-01-16 RX ORDER — POLYETHYLENE GLYCOL 3350 17 G/17G
17 POWDER, FOR SOLUTION ORAL 2 TIMES DAILY PRN
Status: DISCONTINUED | OUTPATIENT
Start: 2022-01-16 | End: 2022-01-17 | Stop reason: HOSPADM

## 2022-01-16 RX ORDER — ALBUTEROL SULFATE 90 UG/1
2 AEROSOL, METERED RESPIRATORY (INHALATION) 2 TIMES DAILY
Status: DISCONTINUED | OUTPATIENT
Start: 2022-01-16 | End: 2022-01-17 | Stop reason: HOSPADM

## 2022-01-16 RX ORDER — GUAIFENESIN/DEXTROMETHORPHAN 100-10MG/5
5 SYRUP ORAL EVERY 6 HOURS
Status: DISCONTINUED | OUTPATIENT
Start: 2022-01-16 | End: 2022-01-17 | Stop reason: HOSPADM

## 2022-01-16 RX ORDER — ACETAMINOPHEN 325 MG/1
650 TABLET ORAL EVERY 4 HOURS PRN
Status: DISCONTINUED | OUTPATIENT
Start: 2022-01-16 | End: 2022-01-17 | Stop reason: HOSPADM

## 2022-01-16 RX ADMIN — DEXAMETHASONE 6 MG: 2 TABLET ORAL at 08:01

## 2022-01-16 RX ADMIN — REMDESIVIR 100 MG: 100 INJECTION, POWDER, LYOPHILIZED, FOR SOLUTION INTRAVENOUS at 12:01

## 2022-01-16 RX ADMIN — ALBUTEROL SULFATE 2 PUFF: 108 INHALANT RESPIRATORY (INHALATION) at 08:01

## 2022-01-16 RX ADMIN — CARVEDILOL 25 MG: 12.5 TABLET, FILM COATED ORAL at 08:01

## 2022-01-16 RX ADMIN — THERA TABS 1 TABLET: TAB at 08:01

## 2022-01-16 RX ADMIN — HEPARIN SODIUM 5000 UNITS: 5000 INJECTION INTRAVENOUS; SUBCUTANEOUS at 09:01

## 2022-01-16 RX ADMIN — HEPARIN SODIUM 5000 UNITS: 5000 INJECTION INTRAVENOUS; SUBCUTANEOUS at 01:01

## 2022-01-16 RX ADMIN — ASPIRIN 81 MG CHEWABLE TABLET 81 MG: 81 TABLET CHEWABLE at 08:01

## 2022-01-16 RX ADMIN — HYDROCHLOROTHIAZIDE 25 MG: 25 TABLET ORAL at 08:01

## 2022-01-16 RX ADMIN — LISINOPRIL 20 MG: 20 TABLET ORAL at 08:01

## 2022-01-16 RX ADMIN — GUAIFENESIN AND DEXTROMETHORPHAN 5 ML: 100; 10 SYRUP ORAL at 05:01

## 2022-01-16 RX ADMIN — GUAIFENESIN AND DEXTROMETHORPHAN 5 ML: 100; 10 SYRUP ORAL at 12:01

## 2022-01-16 RX ADMIN — OXYCODONE HYDROCHLORIDE AND ACETAMINOPHEN 500 MG: 500 TABLET ORAL at 08:01

## 2022-01-16 RX ADMIN — ATORVASTATIN CALCIUM 80 MG: 40 TABLET, FILM COATED ORAL at 08:01

## 2022-01-16 RX ADMIN — HEPARIN SODIUM 5000 UNITS: 5000 INJECTION INTRAVENOUS; SUBCUTANEOUS at 05:01

## 2022-01-16 NOTE — PROGRESS NOTES
"[facility] Medicine  Progress Note    Patient Name: Jenny George  MRN: [id number]  Patient Class: IP- Inpatient   Admission Date: 1/15/2022  Length of Stay: 1 days  Attending Physician: Mohit Pineda MD  Primary Care Provider: Son KARY MD Ruma        Subjective:     Principal Problem:COVID-19        HPI:  Mr. George is an 80 yo M with a h/o HTN, HLD, CKD presenting with cough and transient weakness, admitted for COVID and troponemia.     Reports that cough started ~ 5 days ago. He went to his PCP at the time and had a negative COVID test. He was given a "shot" and seemed to improve thereafter until a couple days ago. Started feeling fatigued and weak. Yesterday, he slumped down in the floor due to weakness and was unable to get up until his son arrived at his house to help. He had decreased appetite this week and PO intake. After eating dinner yesterday, reportedly felt back to normal today. However, his son was concerned and wanted him to be evaluated as he lives alone and generally gets around well without assistance. Went to urgent care and tested positive for COVID so came here. Today, notes persistent, intermittent dry cough. Denies any lightheadedness, sob, palpitations, chest pain, edema, fever, chills, rhinorrhea, sore throat, n/v, abd pain, dysuria, hematuria. Had a couple loose, non-bloody stools 2 days ago, but none since. No h/o heart disease.     Of note, recent admit in 12/2021 for bronchitis. Incidentally found to have a L rectus sheath hematoma that was felt to be spontaneous. Denies any problems since discharge.           Overview/Hospital Course:  80 y/o male admitted with Covid 19 infection and hypoxia.  Started on dexamethasone and remdesivir.  Supplemental oxygen and supportive care.      Interval History:  Feeling better.  Review of Systems   Constitutional: Negative for chills and fever.   Eyes: Negative for pain and itching.   Endocrine: Negative for cold intolerance and " heat intolerance.   Neurological: Negative for dizziness and headaches.     Objective:     Vital Signs (Most Recent):  Temp: 97.8 °F (36.6 °C) (01/15/22 2038)  Pulse: 91 (01/15/22 2038)  Resp: 20 (01/15/22 2038)  BP: (!) 155/77 (01/15/22 2038)  SpO2: 96 % (01/15/22 2038) Vital Signs (24h Range):  Temp:  [97.7 °F (36.5 °C)-98.7 °F (37.1 °C)] 97.8 °F (36.6 °C)  Pulse:  [85-94] 91  Resp:  [18-38] 20  SpO2:  [91 %-98 %] 96 %  BP: (127-155)/(61-84) 155/77     Weight: 63.2 kg (139 lb 5.3 oz)  Body mass index is 24.68 kg/m².    Physical Exam  Vitals reviewed.   Constitutional:       Appearance: Normal appearance.   HENT:      Head: Normocephalic and atraumatic.      Mouth/Throat:      Mouth: Mucous membranes are dry.   Eyes:      Extraocular Movements: Extraocular movements intact.   Cardiovascular:      Rate and Rhythm: Normal rate and regular rhythm.   Pulmonary:      Effort: Pulmonary effort is normal.      Breath sounds: Normal breath sounds.   Abdominal:      General: Abdomen is flat.      Palpations: Abdomen is soft.      Tenderness: There is no abdominal tenderness.   Musculoskeletal:         General: No swelling or deformity. Normal range of motion.      Cervical back: Normal range of motion and neck supple.   Skin:     General: Skin is warm and dry.   Neurological:      General: No focal deficit present.      Mental Status: He is alert and oriented to person, place, and time.   Psychiatric:         Mood and Affect: Mood normal.         Judgment: Judgment normal.             Significant Labs:   All pertinent labs within the past 24 hours have been reviewed.  CBC:   Recent Labs   Lab 01/15/22  1738   WBC 6.74   HGB 11.6*   HCT 33.7*        CMP:   Recent Labs   Lab 01/15/22  1738      K 3.5      CO2 22*   GLU 99   BUN 36*   CREATININE 1.5*   CALCIUM 8.8   PROT 7.4   ALBUMIN 3.7   BILITOT 0.8   ALKPHOS 74   AST 40   ALT 32   ANIONGAP 14   EGFRNONAA 43*     Cardiac Markers:   Recent Labs   Lab  01/15/22  1738   BNP 17     Lipid Panel: No results for input(s): CHOL, HDL, LDLCALC, TRIG, CHOLHDL in the last 48 hours.  Troponin:   Recent Labs   Lab 01/15/22  1738 01/15/22  1959   TROPONINI 0.270* 0.254*       Significant Imaging: I have reviewed all pertinent imaging results/findings within the past 24 hours.      Assessment/Plan:      * COVID-19  Meets criteria for severe with mild hypoxia on admit. Currently resting comfortably on room air.   - remdesivir, dex started  - dose of abx given in ED  - MV, vit C started  - holding full dose AC with recent intraabdominal hematoma, will give prophylactic dose heparin  - monitor O2 sat and use nasal canula prn  - contact/airborne precautions in place      Hematuria  Microscopic. Will need outpatient follow-up.     Elevated troponin  Flat pattern on trend.  NO NSTEMI  Probable demand ischemia from Covid.    Hematoma  Diagnosed in 12/2021, L rectus sheath hematoma measuring up to 17cm, felt to be spontaneous. No symptoms or concern for recurrence/progression.       Hyperlipidemia  Resume statin therapy, high dose in setting of NSTEMI. LDL 54 last month.       Stage 3b chronic kidney disease  Stable at baseline      Macrocytic anemia  Improved from prior admit. No signs of bleeding.       Uncontrolled hypertension  - resume ACEi therapy and HCTZ        VTE Risk Mitigation (From admission, onward)         Ordered     heparin (porcine) injection 5,000 Units  Every 8 hours         01/15/22 2142                Discharge Planning   PAIGE:      Code Status: Full Code   Is the patient medically ready for discharge?:     Reason for patient still in hospital (select all that apply): Treatment  Discharge Plan A: Home                  Mohit Pineda MD  Department of Hospital Medicine   [facility]

## 2022-01-16 NOTE — PLAN OF CARE
Problem: Adult Inpatient Plan of Care  Goal: Plan of Care Review  Outcome: Ongoing, Progressing  Goal: Patient-Specific Goal (Individualized)  Outcome: Ongoing, Progressing  Goal: Absence of Hospital-Acquired Illness or Injury  Outcome: Ongoing, Progressing  Goal: Optimal Comfort and Wellbeing  Outcome: Ongoing, Progressing  Goal: Readiness for Transition of Care  Outcome: Ongoing, Progressing     Problem: Fall Injury Risk  Goal: Absence of Fall and Fall-Related Injury  Outcome: Ongoing, Progressing  Intervention: Identify and Manage Contributors  Flowsheets (Taken 1/16/2022 0547)  Self-Care Promotion:   independence encouraged   BADL personal objects within reach   BADL personal routines maintained  Medication Review/Management:   medications reviewed   high-risk medications identified  Intervention: Promote Injury-Free Environment  Flowsheets (Taken 1/16/2022 0547)  Safety Promotion/Fall Prevention:   assistive device/personal item within reach   bed alarm set   commode/urinal/bedpan at bedside   Fall Risk reviewed with patient/family   lighting adjusted   medications reviewed   nonskid shoes/socks when out of bed   high risk medications identified   instructed to call staff for mobility   toileting scheduled   supervised activity   room near unit station   side rails raised x 2     Problem: Fall Injury Risk  Goal: Absence of Fall and Fall-Related Injury  Outcome: Ongoing, Progressing  Intervention: Identify and Manage Contributors  Flowsheets (Taken 1/16/2022 0547)  Self-Care Promotion:   independence encouraged   BADL personal objects within reach   BADL personal routines maintained  Medication Review/Management:   medications reviewed   high-risk medications identified  Intervention: Promote Injury-Free Environment  Flowsheets (Taken 1/16/2022 0547)  Safety Promotion/Fall Prevention:   assistive device/personal item within reach   bed alarm set   commode/urinal/bedpan at bedside   Fall Risk reviewed with  patient/family   lighting adjusted   medications reviewed   nonskid shoes/socks when out of bed   high risk medications identified   instructed to call staff for mobility   toileting scheduled   supervised activity   room near unit station   side rails raised x 2

## 2022-01-16 NOTE — HOSPITAL COURSE
82 y/o male admitted with Covid 19 infection and hypoxia.  Started on dexamethasone and remdesivir.  Supplemental oxygen and supportive care.  Minimal oxygen requirements and able to wean off oxygen.  Currently doing well on RA.  Patient was also noted to have elevated Troponin on presentation.  Flat pattern and probable demand ischemia secondary Covid infection in setting on CKD.  Patient did not have any chest pain.  Will continue ASA and Statin and follow up with Cardiology.  Patient has remained afebrile and hemodynamically stable.  He will be discharged home to follow up with PCP.  Covid discharge instructions given in [place].

## 2022-01-16 NOTE — NURSING
Received patient from ER to room via stretcher. Patient is awake and alert.  Patient accompanied by transport. Transferred patient to bed. Evaluated general patient appearance and condition. Admit assessment initiated. Tele monitoring initiated. Saline lock at L forarm flushed, Intact. No apparent distress noted at this time. Pt ambulated to bathroom and back to bed one assist, mild unsteadiness noted. Bed alarm engaged call light within reach, bed in lowest position. Will continue to monitor.    Son at bedside.

## 2022-01-16 NOTE — HPI
"Mr. George is an 82 yo M with a h/o HTN, HLD, CKD presenting with cough and transient weakness, admitted for COVID and troponemia.     Reports that cough started ~ 5 days ago. He went to his PCP at the time and had a negative COVID test. He was given a "shot" and seemed to improve thereafter until a couple days ago. Started feeling fatigued and weak. Yesterday, he slumped down in the floor due to weakness and was unable to get up until his son arrived at his house to help. He had decreased appetite this week and PO intake. After eating dinner yesterday, reportedly felt back to normal today. However, his son was concerned and wanted him to be evaluated as he lives alone and generally gets around well without assistance. Went to urgent care and tested positive for COVID so came here. Today, notes persistent, intermittent dry cough. Denies any lightheadedness, sob, palpitations, chest pain, edema, fever, chills, rhinorrhea, sore throat, n/v, abd pain, dysuria, hematuria. Had a couple loose, non-bloody stools 2 days ago, but none since. No h/o heart disease.     Of note, recent admit in 12/2021 for bronchitis. Incidentally found to have a L rectus sheath hematoma that was felt to be spontaneous. Denies any problems since discharge.       "

## 2022-01-16 NOTE — H&P
"[facility] Medicine  History & Physical    Patient Name: Jenny George  MRN: [id number]  Patient Class: IP- Inpatient  Admission Date: 1/15/2022  Attending Physician: Gisele Joel MD   Primary Care Provider: Son B MD Ruma         Patient information was obtained from patient and ER records.     Subjective:     Principal Problem:COVID-19    Chief Complaint:   Chief Complaint   Patient presents with    COVID-19 Concerns     Pt to ER with c/o fatigue, cough, since monday. Pt sent from  for + COVID test         HPI: Mr. George is an 80 yo M with a h/o HTN, HLD, CKD presenting with cough and transient weakness, admitted for COVID and troponemia.     Reports that cough started ~ 5 days ago. He went to his PCP at the time and had a negative COVID test. He was given a "shot" and seemed to improve thereafter until a couple days ago. Started feeling fatigued and weak. Yesterday, he slumped down in the floor due to weakness and was unable to get up until his son arrived at his house to help. He had decreased appetite this week and PO intake. After eating dinner yesterday, reportedly felt back to normal today. However, his son was concerned and wanted him to be evaluated as he lives alone and generally gets around well without assistance. Went to urgent care and tested positive for COVID so came here. Today, notes persistent, intermittent dry cough. Denies any lightheadedness, sob, palpitations, chest pain, edema, fever, chills, rhinorrhea, sore throat, n/v, abd pain, dysuria, hematuria. Had a couple loose, non-bloody stools 2 days ago, but none since. No h/o heart disease.     Of note, recent admit in 12/2021 for bronchitis. Incidentally found to have a L rectus sheath hematoma that was felt to be spontaneous. Denies any problems since discharge.           Past Medical History:   Diagnosis Date    BPH (benign prostatic hyperplasia)     Hyperlipidemia     Hypertension     Hyperthyroidism  "       History reviewed. No pertinent surgical history.    Review of patient's allergies indicates:  No Known Allergies    No current facility-administered medications on file prior to encounter.     Current Outpatient Medications on File Prior to Encounter   Medication Sig    ascorbic acid (VITAMIN C) 1000 MG tablet Take 1,000 mg by mouth once daily.    aspirin (ECOTRIN) 81 MG EC tablet Take 1 tablet (81 mg total) by mouth once daily.    atorvastatin (LIPITOR) 20 MG tablet     carvedilol (COREG) 25 MG tablet Take 25 mg by mouth 2 (two) times daily with meals.    acetaminophen (TYLENOL) 500 MG tablet Take 500 mg by mouth every 6 (six) hours as needed.    benazepril-hydrochlorthiazide (LOTENSIN HCT) 20-25 mg per tablet Take 1 tablet by mouth once daily.    esomeprazole (NEXIUM) 40 MG capsule Take 1 capsule (40 mg total) by mouth before breakfast.    ferrous sulfate (FEOSOL) Tab tablet Take 1 tablet (1 each total) by mouth once daily.    latanoprost 0.005 % ophthalmic solution     mirtazapine (REMERON) 15 MG tablet Take 1 tablet (15 mg total) by mouth every evening.    timolol maleate 0.5% (TIMOPTIC-XR) 0.5 % SolG     vitamin A 06899 UNIT capsule Take 10,000 Units by mouth once daily.    vitamin E 600 UNIT capsule Take 600 Units by mouth once daily.     Family History    None       Tobacco Use    Smoking status: Never Smoker    Smokeless tobacco: Never Used   Substance and Sexual Activity    Alcohol use: No    Drug use: No    Sexual activity: Not on file     Review of Systems   Constitutional: Positive for appetite change. Negative for chills and fever.   HENT: Negative for rhinorrhea and sore throat.    Eyes: Negative for pain and visual disturbance.   Respiratory: Negative for chest tightness, shortness of breath and wheezing.    Cardiovascular: Negative for chest pain, palpitations and leg swelling.   Gastrointestinal: Negative for abdominal pain, blood in stool, nausea and vomiting.   Endocrine:  Negative for polydipsia and polyuria.   Genitourinary: Negative for dysuria, frequency and hematuria.   Musculoskeletal: Negative for back pain and myalgias.   Skin: Negative for rash.   Neurological: Negative for dizziness and headaches.   Psychiatric/Behavioral: Negative for agitation and confusion.     Objective:     Vital Signs (Most Recent):  Temp: 97.8 °F (36.6 °C) (01/15/22 2038)  Pulse: 91 (01/15/22 2038)  Resp: 20 (01/15/22 2038)  BP: (!) 155/77 (01/15/22 2038)  SpO2: 96 % (01/15/22 2038) Vital Signs (24h Range):  Temp:  [97.7 °F (36.5 °C)-98.7 °F (37.1 °C)] 97.8 °F (36.6 °C)  Pulse:  [85-94] 91  Resp:  [18-38] 20  SpO2:  [91 %-98 %] 96 %  BP: (127-155)/(61-84) 155/77     Weight: 63.2 kg (139 lb 5.3 oz)  Body mass index is 24.68 kg/m².    Physical Exam  Vitals reviewed.   Constitutional:       Appearance: Normal appearance.   HENT:      Head: Normocephalic and atraumatic.      Mouth/Throat:      Mouth: Mucous membranes are dry.   Eyes:      Extraocular Movements: Extraocular movements intact.   Cardiovascular:      Rate and Rhythm: Normal rate and regular rhythm.   Pulmonary:      Effort: Pulmonary effort is normal.      Breath sounds: Normal breath sounds.   Abdominal:      General: Abdomen is flat.      Palpations: Abdomen is soft.      Tenderness: There is no abdominal tenderness.   Musculoskeletal:         General: No swelling or deformity. Normal range of motion.      Cervical back: Normal range of motion and neck supple.   Skin:     General: Skin is warm and dry.   Neurological:      General: No focal deficit present.      Mental Status: He is alert and oriented to person, place, and time.   Psychiatric:         Mood and Affect: Mood normal.         Judgment: Judgment normal.             Significant Labs:   All pertinent labs within the past 24 hours have been reviewed.  CBC:   Recent Labs   Lab 01/15/22  1738   WBC 6.74   HGB 11.6*   HCT 33.7*        CMP:   Recent Labs   Lab 01/15/22  1738       K 3.5      CO2 22*   GLU 99   BUN 36*   CREATININE 1.5*   CALCIUM 8.8   PROT 7.4   ALBUMIN 3.7   BILITOT 0.8   ALKPHOS 74   AST 40   ALT 32   ANIONGAP 14   EGFRNONAA 43*     Cardiac Markers:   Recent Labs   Lab 01/15/22  1738   BNP 17     Lipid Panel: No results for input(s): CHOL, HDL, LDLCALC, TRIG, CHOLHDL in the last 48 hours.  Troponin:   Recent Labs   Lab 01/15/22  1738 01/15/22  1959   TROPONINI 0.270* 0.254*       Significant Imaging: I have reviewed all pertinent imaging results/findings within the past 24 hours.    Assessment/Plan:     * COVID-19  Meets criteria for severe with mild hypoxia on admit. Currently resting comfortably on room air.   - remdesivir, dex started  - dose of abx given in ED  - MV, vit C started  - holding full dose AC with recent intraabdominal hematoma, will give prophylactic dose heparin  - monitor O2 sat and use nasal canula prn  - contact/airborne precautions in place    NSTEMI (non-ST elevated myocardial infarction)  Asymptomatic aside from transient weakness that resolved yesterday. No h/o heart disease. Will monitor.  - trend troponin, ekg q6  - telemetry monitoring  - ASA high dose followed by 81mg daily  - high dose statin  - continuation of ACEi and BB therapy  - TTE ordered  - cardiology consulted    Hematoma  Diagnosed in 12/2021, L rectus sheath hematoma measuring up to 17cm, felt to be spontaneous. No symptoms or concern for recurrence/progression.     Hyperlipidemia  Resume statin therapy, high dose in setting of NSTEMI. LDL 54 last month.     Stage 3b chronic kidney disease  Stable at baseline    Macrocytic anemia  Improved from prior admit. No signs of bleeding.     Hypertension  - resume ACEi therapy and HCTZ    Microscopic Hematuria  - will need outpatient follow-up        VTE Risk Mitigation (From admission, onward)         Ordered     heparin (porcine) injection 5,000 Units  Every 8 hours         01/15/22 2142                   Gisele Joel  MD  Department of Hospital Medicine   [facility] - Telemetry [place]

## 2022-01-16 NOTE — SUBJECTIVE & OBJECTIVE
Interval History:  Feeling better.  Review of Systems   Constitutional: Negative for chills and fever.   Eyes: Negative for pain and itching.   Endocrine: Negative for cold intolerance and heat intolerance.   Neurological: Negative for dizziness and headaches.     Objective:     Vital Signs (Most Recent):  Temp: 97.8 °F (36.6 °C) (01/15/22 2038)  Pulse: 91 (01/15/22 2038)  Resp: 20 (01/15/22 2038)  BP: (!) 155/77 (01/15/22 2038)  SpO2: 96 % (01/15/22 2038) Vital Signs (24h Range):  Temp:  [97.7 °F (36.5 °C)-98.7 °F (37.1 °C)] 97.8 °F (36.6 °C)  Pulse:  [85-94] 91  Resp:  [18-38] 20  SpO2:  [91 %-98 %] 96 %  BP: (127-155)/(61-84) 155/77     Weight: 63.2 kg (139 lb 5.3 oz)  Body mass index is 24.68 kg/m².    Physical Exam  Vitals reviewed.   Constitutional:       Appearance: Normal appearance.   HENT:      Head: Normocephalic and atraumatic.      Mouth/Throat:      Mouth: Mucous membranes are dry.   Eyes:      Extraocular Movements: Extraocular movements intact.   Cardiovascular:      Rate and Rhythm: Normal rate and regular rhythm.   Pulmonary:      Effort: Pulmonary effort is normal.      Breath sounds: Normal breath sounds.   Abdominal:      General: Abdomen is flat.      Palpations: Abdomen is soft.      Tenderness: There is no abdominal tenderness.   Musculoskeletal:         General: No swelling or deformity. Normal range of motion.      Cervical back: Normal range of motion and neck supple.   Skin:     General: Skin is warm and dry.   Neurological:      General: No focal deficit present.      Mental Status: He is alert and oriented to person, place, and time.   Psychiatric:         Mood and Affect: Mood normal.         Judgment: Judgment normal.             Significant Labs:   All pertinent labs within the past 24 hours have been reviewed.  CBC:   Recent Labs   Lab 01/15/22  1738   WBC 6.74   HGB 11.6*   HCT 33.7*        CMP:   Recent Labs   Lab 01/15/22  1738      K 3.5      CO2 22*   GLU 99    BUN 36*   CREATININE 1.5*   CALCIUM 8.8   PROT 7.4   ALBUMIN 3.7   BILITOT 0.8   ALKPHOS 74   AST 40   ALT 32   ANIONGAP 14   EGFRNONAA 43*     Cardiac Markers:   Recent Labs   Lab 01/15/22  1738   BNP 17     Lipid Panel: No results for input(s): CHOL, HDL, LDLCALC, TRIG, CHOLHDL in the last 48 hours.  Troponin:   Recent Labs   Lab 01/15/22  1738 01/15/22  1959   TROPONINI 0.270* 0.254*       Significant Imaging: I have reviewed all pertinent imaging results/findings within the past 24 hours.

## 2022-01-16 NOTE — ASSESSMENT & PLAN NOTE
Asymptomatic aside from transient weakness that resolved yesterday. No h/o heart disease. Will monitor.  - trend troponin, ekg q6  - telemetry monitoring  - ASA high dose followed by 81mg daily  - high dose statin  - continuation of ACEi and BB therapy  - TTE ordered  - cardiology consulted

## 2022-01-16 NOTE — PLAN OF CARE
01/16/22 0952   Medicare Message   Important Message from Medicare regarding Discharge Appeal Rights Given to patient/caregiver;Explained to patient/caregiver   Date IMM was signed 01/16/22   Time IMM was signed 0945     CARSON spoke with patient's daughter explaining IMM via phone. Patient daughter voiced understanding. A copy will be mailed to patient's daughter.

## 2022-01-16 NOTE — ASSESSMENT & PLAN NOTE
Diagnosed in 12/2021, L rectus sheath hematoma measuring up to 17cm, felt to be spontaneous. No symptoms or concern for recurrence/progression.

## 2022-01-16 NOTE — ASSESSMENT & PLAN NOTE
Meets criteria for severe with mild hypoxia on admit. Currently resting comfortably on room air.   - remdesivir, dex started  - dose of abx given in ED  - MV, vit C started  - holding full dose AC with recent intraabdominal hematoma, will give prophylactic dose heparin  - monitor O2 sat and use nasal canula prn  - contact/airborne precautions in place

## 2022-01-16 NOTE — PLAN OF CARE
[facility] [place] [facility] [place]  Initial Discharge Assessment       Primary Care Provider: Son KARY MD Ruma    Admission Diagnosis: Cough [R05.9]  Hypoxia [R09.02]  NSTEMI (non-ST elevated myocardial infarction) [I21.4]  Multifocal pneumonia [J18.9]  COVID-19 virus infection [U07.1]    Admission Date: 1/15/2022  Expected Discharge Date:      Discharge Barriers Identified: None    Payor: [employer] MANAGED MEDICARE / Plan: [employer] MEDICARE HMO / Product Type: Capitation /     Extended Emergency Contact Information  Primary Emergency Contact: Yue Jacobson   [facility]  Home Phone: [phone]  Relation: Friend    Discharge Plan A: Home  Discharge Plan B: Home      [facility] [place] [facility] [id number] [place] - [address]  [address]  [place] [address]  Phone: [phone] Fax: [phone]      Initial Assessment (most recent)       Adult Discharge Assessment - 01/16/22 0930          Discharge Assessment    Assessment Type Discharge Planning Assessment     Confirmed/corrected address, phone number and insurance Yes     Confirmed Demographics Correct on Facesheet     Source of Information family     If unable to respond/provide information was family/caregiver contacted? Yes     Contact Name/Number Mr. Tee George [phone]     When was your last doctors appointment? 01/10/22   Patient's son stated last Monday.    Communicated PAIGE with patient/caregiver Date not available/Unable to determine     Reason For Admission Cough     Lives With alone     Facility Arrived From: Home     Do you expect to return to your current living situation? Yes     Do you have help at home or someone to help you manage your care at home? Yes     Who are your caregiver(s) and their phone number(s)? Yue Jacobson (Friend)   [phone]     Readmission within 30 days? No     Do you currently have service(s) that help you manage your care at home? No     Do you take prescription medications? Yes     Do you have  prescription coverage? Yes     Coverage [employer]     Do you have any problems affording any of your prescribed medications? No     Is the patient taking medications as prescribed? yes     Who is going to help you get home at discharge? Le,My (Friend)   [phone]     How do you get to doctors appointments? family or friend will provide     Are you on dialysis? No     Do you take coumadin? No     Discharge Plan A Home     Discharge Plan B Home     Discharge Plan discussed with: Adult children     Discharge Barriers Identified None                     Patient's son: Mr. Oliveira Ariel [phone] provided patient's verified patient's information. He stated patient uses [facility].

## 2022-01-17 VITALS
RESPIRATION RATE: 18 BRPM | SYSTOLIC BLOOD PRESSURE: 138 MMHG | HEART RATE: 74 BPM | HEIGHT: 63 IN | BODY MASS INDEX: 24.8 KG/M2 | WEIGHT: 140 LBS | DIASTOLIC BLOOD PRESSURE: 67 MMHG | OXYGEN SATURATION: 96 % | TEMPERATURE: 98 F

## 2022-01-17 DIAGNOSIS — U07.1 COVID-19 VIRUS DETECTED: ICD-10-CM

## 2022-01-17 PROBLEM — R79.89 ELEVATED TROPONIN: Status: RESOLVED | Noted: 2022-01-15 | Resolved: 2022-01-17

## 2022-01-17 PROBLEM — R31.9 HEMATURIA: Status: RESOLVED | Noted: 2022-01-15 | Resolved: 2022-01-17

## 2022-01-17 LAB
BASOPHILS # BLD AUTO: 0 K/UL (ref 0–0.2)
BASOPHILS NFR BLD: 0 % (ref 0–1.9)
DIFFERENTIAL METHOD: ABNORMAL
EOSINOPHIL # BLD AUTO: 0 K/UL (ref 0–0.5)
EOSINOPHIL NFR BLD: 0 % (ref 0–8)
ERYTHROCYTE [DISTWIDTH] IN BLOOD BY AUTOMATED COUNT: 12.4 % (ref 11.5–14.5)
HCT VFR BLD AUTO: 35.1 % (ref 40–54)
HGB BLD-MCNC: 11.9 G/DL (ref 14–18)
IMM GRANULOCYTES # BLD AUTO: 0.02 K/UL (ref 0–0.04)
IMM GRANULOCYTES NFR BLD AUTO: 0.4 % (ref 0–0.5)
LYMPHOCYTES # BLD AUTO: 0.8 K/UL (ref 1–4.8)
LYMPHOCYTES NFR BLD: 15.6 % (ref 18–48)
MCH RBC QN AUTO: 33.5 PG (ref 27–31)
MCHC RBC AUTO-ENTMCNC: 33.9 G/DL (ref 32–36)
MCV RBC AUTO: 99 FL (ref 82–98)
MONOCYTES # BLD AUTO: 0.9 K/UL (ref 0.3–1)
MONOCYTES NFR BLD: 17.2 % (ref 4–15)
NEUTROPHILS # BLD AUTO: 3.3 K/UL (ref 1.8–7.7)
NEUTROPHILS NFR BLD: 66.8 % (ref 38–73)
NRBC BLD-RTO: 0 /100 WBC
PLATELET # BLD AUTO: 159 K/UL (ref 150–450)
PMV BLD AUTO: 10.7 FL (ref 9.2–12.9)
RBC # BLD AUTO: 3.55 M/UL (ref 4.6–6.2)
WBC # BLD AUTO: 5 K/UL (ref 3.9–12.7)

## 2022-01-17 PROCEDURE — 94761 N-INVAS EAR/PLS OXIMETRY MLT: CPT

## 2022-01-17 PROCEDURE — 36415 COLL VENOUS BLD VENIPUNCTURE: CPT | Performed by: HOSPITALIST

## 2022-01-17 PROCEDURE — 96375 TX/PRO/DX INJ NEW DRUG ADDON: CPT

## 2022-01-17 PROCEDURE — 94640 AIRWAY INHALATION TREATMENT: CPT

## 2022-01-17 PROCEDURE — 25000003 PHARM REV CODE 250: Performed by: HOSPITALIST

## 2022-01-17 PROCEDURE — 63600175 PHARM REV CODE 636 W HCPCS: Performed by: HOSPITALIST

## 2022-01-17 PROCEDURE — G0378 HOSPITAL OBSERVATION PER HR: HCPCS

## 2022-01-17 PROCEDURE — 25000242 PHARM REV CODE 250 ALT 637 W/ HCPCS: Performed by: HOSPITALIST

## 2022-01-17 PROCEDURE — 85025 COMPLETE CBC W/AUTO DIFF WBC: CPT | Performed by: HOSPITALIST

## 2022-01-17 RX ORDER — BENZONATATE 200 MG/1
200 CAPSULE ORAL 3 TIMES DAILY PRN
Qty: 20 CAPSULE | Refills: 0 | Status: SHIPPED | OUTPATIENT
Start: 2022-01-17 | End: 2022-01-27

## 2022-01-17 RX ADMIN — HYDROCHLOROTHIAZIDE 25 MG: 25 TABLET ORAL at 10:01

## 2022-01-17 RX ADMIN — THERA TABS 1 TABLET: TAB at 10:01

## 2022-01-17 RX ADMIN — ALBUTEROL SULFATE 2 PUFF: 108 INHALANT RESPIRATORY (INHALATION) at 09:01

## 2022-01-17 RX ADMIN — HEPARIN SODIUM 5000 UNITS: 5000 INJECTION INTRAVENOUS; SUBCUTANEOUS at 05:01

## 2022-01-17 RX ADMIN — GUAIFENESIN AND DEXTROMETHORPHAN 5 ML: 100; 10 SYRUP ORAL at 05:01

## 2022-01-17 RX ADMIN — DEXAMETHASONE 6 MG: 2 TABLET ORAL at 10:01

## 2022-01-17 RX ADMIN — GUAIFENESIN AND DEXTROMETHORPHAN 5 ML: 100; 10 SYRUP ORAL at 01:01

## 2022-01-17 RX ADMIN — ATORVASTATIN CALCIUM 80 MG: 40 TABLET, FILM COATED ORAL at 10:01

## 2022-01-17 RX ADMIN — GUAIFENESIN AND DEXTROMETHORPHAN 5 ML: 100; 10 SYRUP ORAL at 12:01

## 2022-01-17 RX ADMIN — REMDESIVIR 100 MG: 100 INJECTION, POWDER, LYOPHILIZED, FOR SOLUTION INTRAVENOUS at 10:01

## 2022-01-17 RX ADMIN — OXYCODONE HYDROCHLORIDE AND ACETAMINOPHEN 500 MG: 500 TABLET ORAL at 10:01

## 2022-01-17 RX ADMIN — ASPIRIN 81 MG CHEWABLE TABLET 81 MG: 81 TABLET CHEWABLE at 10:01

## 2022-01-17 NOTE — DISCHARGE SUMMARY
"[facility] [place] Medicine  Discharge Summary      Patient Name: Jenny George  MRN: [id number]  Patient Class: OP- Observation  Admission Date: 1/15/2022  Hospital Length of Stay: 2 days  Discharge Date and Time:  01/17/2022 9:47 AM  Attending Physician: Mohit Pineda MD   Discharging Provider: Mohit Pineda MD  Primary Care Provider: Son KARY MD Ruma      HPI:   Mr. George is an 80 yo M with a h/o HTN, HLD, CKD presenting with cough and transient weakness, admitted for COVID and troponemia.     Reports that cough started ~ 5 days ago. He went to his PCP at the time and had a negative COVID test. He was given a "shot" and seemed to improve thereafter until a couple days ago. Started feeling fatigued and weak. Yesterday, he slumped down in the floor due to weakness and was unable to get up until his son arrived at his house to help. He had decreased appetite this week and PO intake. After eating dinner yesterday, reportedly felt back to normal today. However, his son was concerned and wanted him to be evaluated as he lives alone and generally gets around well without assistance. Went to urgent care and tested positive for COVID so came here. Today, notes persistent, intermittent dry cough. Denies any lightheadedness, sob, palpitations, chest pain, edema, fever, chills, rhinorrhea, sore throat, n/v, abd pain, dysuria, hematuria. Had a couple loose, non-bloody stools 2 days ago, but none since. No h/o heart disease.     Of note, recent admit in 12/2021 for bronchitis. Incidentally found to have a L rectus sheath hematoma that was felt to be spontaneous. Denies any problems since discharge.           * No surgery found *      Hospital Course:   80 y/o male admitted with Covid 19 infection and hypoxia.  Started on dexamethasone and remdesivir.  Supplemental oxygen and supportive care.  Minimal oxygen requirements and able to wean off oxygen.  Currently doing well on RA.  Patient was also noted to have " elevated Troponin on presentation.  Flat pattern and probable demand ischemia secondary Covid infection in setting on CKD.  Patient did not have any chest pain.  Will continue ASA and Statin and follow up with Cardiology.  Patient has remained afebrile and hemodynamically stable.  He will be discharged home to follow up with PCP.  Covid discharge instructions given in Danish.       Goals of Care Treatment Preferences:  Code Status: Full Code      Consults:     No new Assessment & Plan notes have been filed under this hospital service since the last note was generated.  Service: Hospital Medicine    Final Active Diagnoses:    Diagnosis Date Noted POA    PRINCIPAL PROBLEM:  COVID-19 [U07.1] 01/15/2022 Yes    Hematoma [T14.8XXA] 01/15/2022 Yes    Stage 3b chronic kidney disease [N18.32] 12/01/2021 Yes    Hyperlipidemia [E78.5] 12/01/2021 Yes    Macrocytic anemia [D53.9] 12/01/2021 Yes    Uncontrolled hypertension [I10] 10/02/2012 Yes      Problems Resolved During this Admission:    Diagnosis Date Noted Date Resolved POA    Elevated troponin [R77.8] 01/15/2022 01/17/2022 Yes    Hematuria [R31.9] 01/15/2022 01/17/2022 Yes       Discharged Condition: stable    Disposition: Home or Self Care    Follow Up:   Follow-up Information     Matt Hendrix MD In 1 week.    Specialty: Family Medicine  Contact information:  [address] [place] [address]  [phone]             Jasson Becerra MD In 2 weeks.    Specialty: Cardiology  Contact information:  [address]  [address]  [place] [id number]  [phone]                       Patient Instructions:      Diet Cardiac     Notify your health care provider if you experience any of the following:  temperature >100.4     Notify your health care provider if you experience any of the following:  persistent nausea and vomiting or diarrhea     Notify your health care provider if you experience any of the following:  severe uncontrolled pain     Notify your health care  provider if you experience any of the following:  difficulty breathing or increased cough     Notify your health care provider if you experience any of the following:  persistent dizziness, light-headedness, or visual disturbances     Notify your health care provider if you experience any of the following:  increased confusion or weakness     Activity as tolerated           Pending Diagnostic Studies:     None         Medications:  Reconciled Home Medications:      Medication List      START taking these medications    benzonatate 200 MG capsule  Commonly known as: TESSALON  Take 1 capsule (200 mg total) by mouth 3 (three) times daily as needed for Cough.        CONTINUE taking these medications    acetaminophen 500 MG tablet  Commonly known as: TYLENOL  Take 500 mg by mouth every 6 (six) hours as needed.     ascorbic acid (vitamin C) 1000 MG tablet  Commonly known as: VITAMIN C  Take 1,000 mg by mouth once daily.     aspirin 81 MG EC tablet  Commonly known as: ECOTRIN  Take 1 tablet (81 mg total) by mouth once daily.     atorvastatin 20 MG tablet  Commonly known as: LIPITOR     benazepril-hydrochlorthiazide 20-25 mg Tab  Commonly known as: LOTENSIN HCT  Take 1 tablet by mouth once daily.     carvediloL 25 MG tablet  Commonly known as: COREG  Take 25 mg by mouth 2 (two) times daily with meals.     esomeprazole 40 MG capsule  Commonly known as: NexIUM  Take 1 capsule (40 mg total) by mouth before breakfast.     ferrous sulfate Tab tablet  Commonly known as: FEOSOL  Take 1 tablet (1 each total) by mouth once daily.     latanoprost 0.005 % ophthalmic solution     mirtazapine 15 MG tablet  Commonly known as: REMERON  Take 1 tablet (15 mg total) by mouth every evening.     timolol maleate 0.5% 0.5 % Solg  Commonly known as: TIMOPTIC-XE     vitamin A 86684 UNIT capsule  Take 10,000 Units by mouth once daily.     vitamin E 600 UNIT capsule  Take 600 Units by mouth once daily.            Indwelling Lines/Drains at time of  discharge:   Lines/Drains/Airways     None                 Time spent on the discharge of patient: >30 minutes         Mohit Pineda MD  Department of Hospital Medicine  [facility]

## 2022-01-17 NOTE — DISCHARGE INSTRUCTIONS
Patient Education       H???ng dâ?n Xuâ?t viê?n cho COVID-19   Gi??i thiê?u vê? farah? ?ê? na?y   B?nh viêm ph?i do vi rút Corona 2019 còn g?i là COVID-19. ?ó là m?t b?nh christopher?m vi rút ?nh h??ng ??n ph?i. B?nh này gây ra do m?t vi rút g?i là vi rút corona liên kristy ??n SARS (SARS-CoV-2).  Các d?u hi?u c?a COVID-19 th??ng s? black?t hi?n vài ngày lida khi quý v? b? christopher?m. ? m?t s? ng??i, các d?u hi?u s? black?t hi?n mu?n h?n. Có nh?ng ng??i l?i không noel gi? có d?u hi?u b? christopher?m b?nh. Quý v? có th? b? ho, s?t, ?n l?nh và có th? khó th?. Quý v? có th? r?t m?t m?i, ?au c?, ?au ??u ho?c ?au h?ng. M?t s? ng??i b? r?i lo?n d? dày ho?c ?i tiêu phân l?ng. Nh?ng ng??i khác b? m?t mùi ho?c v?. Không ph?i lúc nào quý v? c?ng có nh?ng d?u hi?u này và chúng có th? black?t hi?n và h?t khi quý v? b? b?nh.  Viru?t này lây kimani d? dàng jes các gi?t b?n v?ng ra khi quý v? nói blanche?n, h?t h?i ho?c ho. Quý v? có th? truy?n virút cho ng??i khác khi nói blanche?n g?n nhau, hát, ôm, dùng cox th?c ?n ho?c b?t claudia. Bác s? tin r?ng vi trùng c?ng t?n t?i trên các b? m?t nh? bàn, claudia n?m c?a và ?i?n tho?i. Nam nhiên, ?ây không ph?i là cách ph? bi?n khi?n COVID-19 lây kimani. Bác s? tin r?ng quý v? c?ng có th? lây truy?n b?nh ngay c? khi không có b?t k? tri?u ch?ng nào, nh?ng h? ch?a bi?t ?i?u ?ó x?y ra nh? th? nào. ?ây là lý do t?i bulmaro tiêm v?c-akira là m?t za nh?ng cách t?t nh?t ?? làm ch?m s? lây kimani c?a vi-rút.  M?t s? ng??i b? christopher?m COVID-19 nh? có th? ? nhà và cách xa ng??i khác cho ??n khi kh?e h?n. Nh?ng ng??i khác co? thê? pha?i nh?p viê?n nê?u b?nh râ?t n??ng. M?t s? ng??i b? COVID-19 có th? b? m?t s? tri?u ch?ng za vài tu?n ho?c vài tháng. Nh?ng ng??i b? christopher?m COVID-19 ph?i t? cách ly. Quý v? có th? b?t ??u ? g?n nh?ng ng??i khác khi bác s? cho bi?t ?i?u ?ó là an toàn.       Câ?n ch?m so?c gi? ta?i nha??   · Ha?y ho?i bác s? cu?a sunil? vi? ?? bi?t nh??ng viê?c câ?n la?m khi sunil? vi? vê? nha?. Ha?y nh? ???elisha samson?i nê?u sunil? vi? diana hiê?u nh??ng  ?i?u bác s? no?i.  · U?ng christopher?u n??c, n??c e?p tra?i cây ho?c n??c canh ?? thay th? l??ng ch?t l?ng b? m?t khi b? s?t.  · Có th? s? d?ng máy phun s??ng t?o ?m ?? gi?m ngh?t m?i và ho.  · Du?ng 2 ?ê?n 3 chiê?c gô?i kê jordan khi n??m ?ê? giu?p d? th?? va? dê? jelly? h?n.  · Không hút thu?c và không uô?ng robbin, r???u va? ?ô? uô?ng h?n h?p (có ch?a cô?n).  · ?? gi?m nguy c? lây b?nh cho ng??i khác, hãy tiêm v?c-akira COVID-19.  · N?u quý v? ch?a ???c tiêm v?c-akira ??y ??, hãy:  ? ?eo kh?u richmond zach mi?ng và m?i n?u quý v? ? xung quanh nh?ng ng??i khác không b? b?nh. Kh?u richmond christopher?u l?p v?i là t?t nh?t.  ? Th???ng xuyên r??a claudia.  ? ? nhà za m?t phòng riêng bi?t, tránh xa nh?ng ng??i khác n?u có th?. Ch? ra ngoài khi ?i ch?m sóc y t?.  ? S? d?ng phòng t?m riêng n?u có th?.  ? Không farah?n b? th?c ?n cho ng??i khác.  Câ?n ch?m so?c gi? tiê?p augusto?   · Bác s? c?a quý v? co? thê? yêu câ?u nicanor? vi? tái quan?m t?i v?n alona?ng ?ê? kiê?m tra s? ti?n triê?n cu?a nicanor? vi?. ??m b?o th?c hi?n ??y ?? nh??ng lâ?n tái quan?m na?y. B?o ??m ph?i elzbieta kh?u richmond ? nh?ng bu?i khám này.  · N?u có th?, nên báo tr??c cho nhân viên y t? bi?t là quý v? b? christopher?m COVID-19 ?? h? c?n tr?ng h?n nh?m tránh lây kimani b?nh.  · Có th? c?n vài tu?n thì s?c kh?e c?a quý v? m?i tr? l?i bình th??ng.  Câ?n du?ng nh??ng monse?i thuô?c gi??   Bác s? có th? ch? ??nh dùng thu?c ??:  · Giu?p hô hâ?p  · Gi?m s?t  · Giúp tr? s?ng za ???ng th?? và ph?i c?a quý v?  · Ki?m soát c?n ho  · Gi?m ?au h?ng  · Giu?p carly?m sô? mu?i ho??c nghe?t mu?i  Glynn?t ?ô?ng thê? châ?t co? bi? ha?n chê? không?   Nicanor? vi? co? thê? pha?i ha?n chê? glynn?t ?ô?ng th? ch?t. Hãy trao ?ô?i v??i bác s? c?a quý v? vê? m?c ?? glynn?t ?ô?ng phù h??p v?i nicanor? vi?. N?u ?ã b? christopher?m COVID-19 n?ng, thì có th? ph?i m?t th?i eh m?i bình ph?c ???c.  Co? câ?n kiê?u ch?m so?c na?o quan?c không?   Bác s? không bi?t quý v? có th? truy?n vi rút cho ng??i khác noel lâu lida khi quý v? b? b?nh. ?ây là lý do t?i bulmaro fergoso b?  b?nh thì ?i?u kristy tr?ng là quý v? ph?i ? za m?t phòng riêng bi?t, n?u có th?. Hi?n t?i, bác s? ?ang ??a ra nh?ng h??ng d?n cox c?n tuân th? khi quý v? b? b?nh. Tr??c khi ? g?n ng??i khác, quý v? ph?i:  · H?t s?t za 3 ngày mà không dùng b?t k? lo?i thu?c h? s?t nào  · Không có tri?u ch?ng ho ho?c khó th?  · Ch? ít nh?t 10 ngày lida khi quý v? có các tri?u ch?ng ??u tiên ho?c k?t qu? xét charissa?m d??ng tính ??u tiên và không còn nh?ng tri?u ch?ng nêu trên. M?t s? chuyên carly ?? ngh? nên ch? 14 ngày. Nikole?ng th?i eh mà quý v? c?n tránh xa ng??i khác có th? d?a trên m?c ?? nghiêm tr?ng c?a các tri?u ch?ng ho?c n?u quý v? có v?n ?? v? h? mi?n d?ch c?a mình.  Hãy trao ??i v?i bác s? c?a quý v? v? vi?c tiêm v?c akira COVID-19.  Nh??ng vâ?n ?ê? gi? co? thê? xa?y ra?   · M?t n??c. ?ây là tình tr?ng m?t n??c.  · T?n th??ng ph?i ng?n h?n ho?c dài h?n  · Ca?c vâ?n ?ê? vê? fede  · T?? cordell  Khi na?o tôi câ?n go?i cho ba?c sy??   · Quý v? khó th? ??n m?c m?i l?n ch? có th? nói m?t ho?c keisha t?.  · Quý v? ph?i ng?i th?ng thì m?i có th? th? ???c và/ho?c không th? n?m black?ng.  · Quý v? r?t lú l?n ho?c không th? t?nh táo.  · Môi ho?c da c?a quý v? b?t ??u blacnhe?n sang màu xanh ho?c xám.  · Quý v? ngh? r?ng mình có th? ?ang g?p m?t tr??ng h?p kh?n c?p y t?. M?t s? ví d? v? tr??ng h?p kh?n c?p y t? là:  ? ?au ng?c d? d?i.  ? Không th? nói ho?c c? ??ng bình th??ng.  · Quý v? khó th? khi nói blanche?n ho?c ng?i yên.  · Quý v? có nh?ng c?n th? d?c m?i.  · Quý v? tr? nên y?u ho?c chóng m?t.  · Quý v? có n??c ti?u r?t s?m màu ho?c không ?i ti?u za h?n 8 gi?.  · Quý v? có các tri?u ch?ng COVID-19 m?i ho?c tr?m tr?ng h?n nh?:  ? S?t  ? Ho  ? Ca?m thâ?y râ?t mê?t mo?i  ? L?nh run ng??i  ? Nh??c ?â?u  ? Nikole? nuô?t  ? Nôn m??a  ? Phân lo?ng  ? Ngón claudia ho?c ngón chân c?a quý v? black?t hi?n các ??m màu tím ??  Ph??ng Pháp Gi?i Thích L?i: Giúp Quý V? Th?u Hi?u   Ph??maverick linton D?y L?i robin qudipika v? hi?u nh??ng thông tin mà chúng tôi cung c?p cho quý v?.  Wilma khi trao ??i v?i nhân viên, hãy nói la?i v?i h? augusto ca?ch diê?n ?a?t cu?a nicanor? vi? v? nh?ng gì quý v? l?nh h?i ???c. ?i?u này giúp ??m b?o nhân viên ?ã mô t? rõ ràng t?ng n?i dung và c?ng giúp làm rõ nh?ng ?i?u có th? còn nh?m l?n. Tr??c khi v? nhà, hãy ??m b?o quý v? có th? làm nh?ng vi?c wilma:  · Tôi có th? cho quý v? bi?t v? tình tr?ng c?a mình.  · Tôi có th? cho maxwell/ch? bi?t nh?ng cách có th? giúp tôi d? th? h?n.  · Tôi có th? cho maxwell/ch? bi?t nh?ng vi?c tôi có th? làm ?? tránh lây christopher?m cho ng??i khác.  · Tôi có th? cho quý v? bi?t ?i?u tôi s? làm n?u tôi b? khó th?; c?m th?y bu?n ng? ho?c lú l?n; ho?c da, môi ho?c ??u ngón claudia, móng claudia có màu xanh l?t.  Tuyên Bô? Miê?n Tra?ch Nhiê?m va? Viê?c S?? Du?ng Thông Tin Eduin?ch Ha?ng   Thông tin na?y không pha?i la? l??i khuyên y tê? cu? thê? va? không thay thê? cho thông tin ma? nicanor? vi? nhâ?n ????c t?? chuyên carly ch?m sóc s?c kh?e cu?a mi?nh. ?ây chi? la? ba?n to?m t??t ng??n go?n ca?c thông tin cox. Ba?n na?y [facility] noel g?m ??y ?? thông tin v? ca?c ti?nh tra?ng, b?nh t?t, t?n th??ng, xe?t nghiê?m, th? thuâ?t, ph??ng pháp ?i?u tr?, li?u pháp, h??ng d?n xuâ?t viê?n ho?c l?a ch?n vê? l?i s?ng có th? áp d?ng cho nicanor? vi?. Nicanor? vi? ph?i trao ?ô?i v?i chuyên carly ch?m sóc s?c kh?e c?a quý v? ?? có thông tin ??y ?? v? s?c kh?e và ca?c l??a ch?n ?i?u tr? c?a mi?nh. Không nên s? d?ng thông tin này ?? nicanor?t ??nh có ch?p nh?n s?? t? v?n, h??ng d?n hay khuy?n ngh? cu?a chuyên carly ch?m sóc s?c kh?e c?a quý v? hay không. Ch? có chuyên carly ch?m sóc s?c kh?e c?a nicanor? vi? m??i có ?? ki?n th?c và s?? ?ào t?o ?? cho nicanor? vi? l?i khuyên phù h?p.

## 2022-01-17 NOTE — PLAN OF CARE
Problem: Adult Inpatient Plan of Care  Goal: Plan of Care Review  Outcome: Ongoing, Progressing  Goal: Patient-Specific Goal (Individualized)  Outcome: Ongoing, Progressing  Goal: Absence of Hospital-Acquired Illness or Injury  Outcome: Ongoing, Progressing  Goal: Optimal Comfort and Wellbeing  Outcome: Ongoing, Progressing  Goal: Readiness for Transition of Care  Outcome: Ongoing, Progressing     Problem: Fall Injury Risk  Goal: Absence of Fall and Fall-Related Injury  Outcome: Ongoing, Progressing  Intervention: Identify and Manage Contributors  Flowsheets (Taken 1/17/2022 0546)  Self-Care Promotion:   independence encouraged   BADL personal objects within reach   BADL personal routines maintained  Medication Review/Management:   medications reviewed   high-risk medications identified  Intervention: Promote Injury-Free Environment  Flowsheets (Taken 1/17/2022 0546)  Safety Promotion/Fall Prevention:   assistive device/personal item within reach   commode/urinal/bedpan at bedside   Fall Risk reviewed with patient/family   lighting adjusted   nonskid shoes/socks when out of bed   room near unit station   Supervised toileting - stay within arms reach   instructed to call staff for mobility   supervised activity   medications reviewed   high risk medications identified     Problem: Fall Injury Risk  Goal: Absence of Fall and Fall-Related Injury  Outcome: Ongoing, Progressing  Intervention: Identify and Manage Contributors  Flowsheets (Taken 1/17/2022 0546)  Self-Care Promotion:   independence encouraged   BADL personal objects within reach   BADL personal routines maintained  Medication Review/Management:   medications reviewed   high-risk medications identified  Intervention: Promote Injury-Free Environment  Flowsheets (Taken 1/17/2022 0546)  Safety Promotion/Fall Prevention:   assistive device/personal item within reach   commode/urinal/bedpan at bedside   Fall Risk reviewed with patient/family   lighting  adjusted   nonskid shoes/socks when out of bed   room near unit station   Supervised toileting - stay within arms reach   instructed to call staff for mobility   supervised activity   medications reviewed   high risk medications identified

## 2022-01-17 NOTE — PLAN OF CARE
Problem: Adult Inpatient Plan of Care  Goal: Plan of Care Review  Outcome: Ongoing, Progressing  Goal: Patient-Specific Goal (Individualized)  Outcome: Ongoing, Progressing  Goal: Absence of Hospital-Acquired Illness or Injury  Outcome: Ongoing, Progressing  Goal: Optimal Comfort and Wellbeing  Outcome: Ongoing, Progressing  Goal: Readiness for Transition of Care  Outcome: Ongoing, Progressing     Problem: Fall Injury Risk  Goal: Absence of Fall and Fall-Related Injury  Outcome: Ongoing, Progressing     Problem: Mobility Impairment  Goal: Optimal Mobility  Outcome: Ongoing, Progressing   No falls, safety maintained. VSS, afebrile. Pt walking with standby assist, slightly unsteady. Fair appetite.

## 2022-01-17 NOTE — PT/OT/SLP PROGRESS
Physical Therapy      Patient Name:  Jenny George   MRN:  [id number]    Patient not seen today secondary to  (Dr. Pineda Cancelled PT order prior to evaluation taking place). PT to sign off.

## 2022-01-17 NOTE — PLAN OF CARE
Problem: Adult Inpatient Plan of Care  Goal: Plan of Care Review  Outcome: Met  Goal: Patient-Specific Goal (Individualized)  Outcome: Met  Goal: Absence of Hospital-Acquired Illness or Injury  Outcome: Met  Goal: Optimal Comfort and Wellbeing  Outcome: Met  Goal: Readiness for Transition of Care  Outcome: Met     Problem: Fall Injury Risk  Goal: Absence of Fall and Fall-Related Injury  Outcome: Met     Problem: Mobility Impairment  Goal: Optimal Mobility  Outcome: Met     Problem: Breathing Pattern Ineffective  Goal: Effective Breathing Pattern  Outcome: Met     Problem: Skin Injury Risk Increased  Goal: Skin Health and Integrity  Outcome: Met

## 2022-01-17 NOTE — PLAN OF CARE
01/17/22 0955   Final Note   Assessment Type Final Discharge Note   Anticipated Discharge Disposition Home   Hospital Resources/Appts/Education Provided Appointments scheduled and added to AVS;Provided education on problems/symptoms using teachback   Post-Acute Status   Post-Acute Authorization Other   Other Status No Post-Acute Service Needs   Pts nurse Griselda notified that the pt can d/c from CM standpoint

## 2022-01-17 NOTE — NURSING
Report received from Kenia LEE. Patient resting comfortably in bed, breathing spontenously in room air,  no acute distress noted. Plan of care reviewed with patient. Instructed patient to call for assistance before ambulating, side rails up x2, bed alarm set, call light in reach, non skid socks in use. Son at bedside. Peripheral IV site, no redness or swelling noted.  Patient verbalized understanding of instructions.  Will continue to monitor.

## 2022-01-17 NOTE — NURSING
Discharge instructions given to patient and son at bedside. Patient verbalized understanding of instructions. Patient states willingness to comply. Saline lock removed. Tele monitoring removed.

## 2022-01-17 NOTE — PROGRESS NOTES
WRITTEN HEALTHCARE DISCHARGE INFORMATION      Things that YOU are RESPONSIBLE for to Manage Your Care At Home:     1. Getting your prescriptions filled.  2. Taking you medications as directed. DO NOT MISS ANY DOSES!  3. Going to your follow-up doctor appointments. This is important because it allows the doctor to monitor your progress and to determine if any changes need to be made to your treatment plan.     If you are unable to make your follow up appointments, please call the number listed and reschedule this appointment.      ____________HELP AT HOME____________________     Experiencing any SIGNS or SYMPTOMS: YOU CAN     Schedule a same day appopintment with your Primary Care Doctor or  you can call [facility] On Call Nurse Care Line for 24/7 assistance at [phone]     If you are experience any signs or symptoms that have become severe, Call 911 and come to your nearest Emergency Room.     Thank you for choosing [facility] and allowing us to care for you.   From your care management team:      You should receive a call from [facility] Discharge Department within 48-72 hours to help manage your care after discharge. Please try to make sure that you answer your phone for this important phone call.     Follow-up Information     Matt Hendrix MD In 1 week.    Specialty: Family Medicine  Why: call to schedule follow up appointment with pcp as need post hospital discharge.  Contact information:  [address] [place] [id number]  [phone]             Jasson Becerra MD On 2/1/2022.    Specialty: Cardiology  Why: appointment scheduled for February 1st at 3pm  Contact information:  [address]  [address]  [place] [id number]  [phone]

## 2022-02-01 ENCOUNTER — OFFICE VISIT (OUTPATIENT)
Dept: CARDIOLOGY | Facility: CLINIC | Age: 82
End: 2022-02-01
Payer: MEDICARE

## 2022-02-01 VITALS
BODY MASS INDEX: 24.63 KG/M2 | WEIGHT: 139 LBS | HEIGHT: 63 IN | HEART RATE: 81 BPM | SYSTOLIC BLOOD PRESSURE: 141 MMHG | DIASTOLIC BLOOD PRESSURE: 78 MMHG

## 2022-02-01 DIAGNOSIS — I27.20 PULMONARY HYPERTENSION: Primary | ICD-10-CM

## 2022-02-01 DIAGNOSIS — R79.89 ELEVATED TROPONIN: ICD-10-CM

## 2022-02-01 DIAGNOSIS — U07.1 COVID-19: ICD-10-CM

## 2022-02-01 DIAGNOSIS — R07.89 CHEST PAIN, ATYPICAL: ICD-10-CM

## 2022-02-01 DIAGNOSIS — I10 UNCONTROLLED HYPERTENSION: ICD-10-CM

## 2022-02-01 DIAGNOSIS — I77.9 CAROTID ARTERY DISEASE, UNSPECIFIED LATERALITY, UNSPECIFIED TYPE: ICD-10-CM

## 2022-02-01 PROCEDURE — 1126F AMNT PAIN NOTED NONE PRSNT: CPT | Mod: CPTII,S$GLB,, | Performed by: INTERNAL MEDICINE

## 2022-02-01 PROCEDURE — 3288F PR FALLS RISK ASSESSMENT DOCUMENTED: ICD-10-PCS | Mod: CPTII,S$GLB,, | Performed by: INTERNAL MEDICINE

## 2022-02-01 PROCEDURE — 1111F DSCHRG MED/CURRENT MED MERGE: CPT | Mod: CPTII,S$GLB,, | Performed by: INTERNAL MEDICINE

## 2022-02-01 PROCEDURE — 1101F PT FALLS ASSESS-DOCD LE1/YR: CPT | Mod: CPTII,S$GLB,, | Performed by: INTERNAL MEDICINE

## 2022-02-01 PROCEDURE — 1126F PR PAIN SEVERITY QUANTIFIED, NO PAIN PRESENT: ICD-10-PCS | Mod: CPTII,S$GLB,, | Performed by: INTERNAL MEDICINE

## 2022-02-01 PROCEDURE — 99999 PR PBB SHADOW E&M-EST. PATIENT-LVL III: ICD-10-PCS | Mod: PBBFAC,,, | Performed by: INTERNAL MEDICINE

## 2022-02-01 PROCEDURE — 99204 OFFICE O/P NEW MOD 45 MIN: CPT | Mod: S$GLB,,, | Performed by: INTERNAL MEDICINE

## 2022-02-01 PROCEDURE — 1111F PR DISCHARGE MEDS RECONCILED W/ CURRENT OUTPATIENT MED LIST: ICD-10-PCS | Mod: CPTII,S$GLB,, | Performed by: INTERNAL MEDICINE

## 2022-02-01 PROCEDURE — 99204 PR OFFICE/OUTPT VISIT, NEW, LEVL IV, 45-59 MIN: ICD-10-PCS | Mod: S$GLB,,, | Performed by: INTERNAL MEDICINE

## 2022-02-01 PROCEDURE — 1159F PR MEDICATION LIST DOCUMENTED IN MEDICAL RECORD: ICD-10-PCS | Mod: CPTII,S$GLB,, | Performed by: INTERNAL MEDICINE

## 2022-02-01 PROCEDURE — 3078F DIAST BP <80 MM HG: CPT | Mod: CPTII,S$GLB,, | Performed by: INTERNAL MEDICINE

## 2022-02-01 PROCEDURE — 99999 PR PBB SHADOW E&M-EST. PATIENT-LVL III: CPT | Mod: PBBFAC,,, | Performed by: INTERNAL MEDICINE

## 2022-02-01 PROCEDURE — 3288F FALL RISK ASSESSMENT DOCD: CPT | Mod: CPTII,S$GLB,, | Performed by: INTERNAL MEDICINE

## 2022-02-01 PROCEDURE — 3077F SYST BP >= 140 MM HG: CPT | Mod: CPTII,S$GLB,, | Performed by: INTERNAL MEDICINE

## 2022-02-01 PROCEDURE — 3078F PR MOST RECENT DIASTOLIC BLOOD PRESSURE < 80 MM HG: ICD-10-PCS | Mod: CPTII,S$GLB,, | Performed by: INTERNAL MEDICINE

## 2022-02-01 PROCEDURE — 3077F PR MOST RECENT SYSTOLIC BLOOD PRESSURE >= 140 MM HG: ICD-10-PCS | Mod: CPTII,S$GLB,, | Performed by: INTERNAL MEDICINE

## 2022-02-01 PROCEDURE — 1159F MED LIST DOCD IN RCRD: CPT | Mod: CPTII,S$GLB,, | Performed by: INTERNAL MEDICINE

## 2022-02-01 PROCEDURE — 1101F PR PT FALLS ASSESS DOC 0-1 FALLS W/OUT INJ PAST YR: ICD-10-PCS | Mod: CPTII,S$GLB,, | Performed by: INTERNAL MEDICINE

## 2022-02-01 NOTE — PROGRESS NOTES
"Subjective:    Patient ID:  Jenny JONO George is a 81 y.o. male who presents for follow-up of No chief complaint on file.      HPI     Admitted 1/15/22  Mr. George is an 82 yo M with a h/o HTN, HLD, CKD presenting with cough and transient weakness, admitted for COVID and troponemia.      Reports that cough started ~ 5 days ago. He went to his PCP at the time and had a negative COVID test. He was given a "shot" and seemed to improve thereafter until a couple days ago. Started feeling fatigued and weak. Yesterday, he slumped down in the floor due to weakness and was unable to get up until his son arrived at his house to help. He had decreased appetite this week and PO intake. After eating dinner yesterday, reportedly felt back to normal today. However, his son was concerned and wanted him to be evaluated as he lives alone and generally gets around well without assistance. Went to urgent care and tested positive for COVID so came here. Today, notes persistent, intermittent dry cough. Denies any lightheadedness, sob, palpitations, chest pain, edema, fever, chills, rhinorrhea, sore throat, n/v, abd pain, dysuria, hematuria. Had a couple loose, non-bloody stools 2 days ago, but none since. No h/o heart disease.      Of note, recent admit in 12/2021 for bronchitis. Incidentally found to have a L rectus sheath hematoma that was felt to be spontaneous. Denies any problems since discharge.     80 y/o male admitted with Covid 19 infection and hypoxia.  Started on dexamethasone and remdesivir.  Supplemental oxygen and supportive care.  Minimal oxygen requirements and able to wean off oxygen.  Currently doing well on RA.  Patient was also noted to have elevated Troponin on presentation.  Flat pattern and probable demand ischemia secondary Covid infection in setting on CKD.  Patient did not have any chest pain.  Will continue ASA and Statin and follow up with Cardiology.  Patient has remained afebrile and hemodynamically stable.  He will be " discharged home to follow up with PCP.  Covid discharge instructions given in Luxembourgish.  Troponin 0.25 flat pattern     Echo 1/16/22  · The left ventricle is normal in size with mild concentric hypertrophy and normal systolic function.  · The estimated ejection fraction is 70%.  · Normal right ventricular size with normal right ventricular systolic function.  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  · Indeterminate central venous pressure. Estimated PA systolic pressure is at least 24 mmHg.    Stress test 2016  No evidence of stress induced myocardial ischemia.     2/1/22 Still weak after discharge. Occasional sharp CP. Mild DONNELLY  BP stable    Review of Systems   Constitutional: Negative for decreased appetite.   HENT: Negative for ear discharge.    Eyes: Negative for blurred vision.   Endocrine: Negative for polyphagia.   Skin: Negative for nail changes.   Genitourinary: Negative for bladder incontinence.   Neurological: Negative for aphonia.   Psychiatric/Behavioral: Negative for hallucinations.   Allergic/Immunologic: Negative for hives.        Objective:    Physical Exam  Constitutional:       Appearance: He is well-developed and well-nourished.   HENT:      Head: Normocephalic and atraumatic.   Eyes:      Conjunctiva/sclera: Conjunctivae normal.      Pupils: Pupils are equal, round, and reactive to light.   Cardiovascular:      Rate and Rhythm: Normal rate.      Pulses: Intact distal pulses.      Heart sounds: Normal heart sounds.   Pulmonary:      Effort: Pulmonary effort is normal.      Breath sounds: Normal breath sounds.   Abdominal:      General: Bowel sounds are normal.      Palpations: Abdomen is soft.   Musculoskeletal:         General: Normal range of motion.      Cervical back: Normal range of motion and neck supple.   Skin:     General: Skin is warm and dry.   Neurological:      Mental Status: He is alert and oriented to person, place, and time.           Assessment:       1. Pulmonary hypertension     2. Uncontrolled hypertension    3. Carotid artery disease, unspecified laterality, unspecified type    4. COVID-19    5. Elevated troponin    6. Chest pain, atypical         Plan:           Lexiscan myoview for CP and recent troponin elevation during COVID infection  Continue Rx for HTN, pulmonary HTN, carotid didease

## 2022-03-02 ENCOUNTER — HOSPITAL ENCOUNTER (OUTPATIENT)
Dept: RADIOLOGY | Facility: HOSPITAL | Age: 82
Discharge: HOME | End: 2022-03-02
Attending: INTERNAL MEDICINE
Payer: MEDICARE

## 2022-03-02 ENCOUNTER — HOSPITAL ENCOUNTER (OUTPATIENT)
Dept: CARDIOLOGY | Facility: HOSPITAL | Age: 82
Discharge: HOME | End: 2022-03-02
Attending: INTERNAL MEDICINE
Payer: MEDICARE

## 2022-03-02 DIAGNOSIS — I77.9 CAROTID ARTERY DISEASE, UNSPECIFIED LATERALITY, UNSPECIFIED TYPE: ICD-10-CM

## 2022-03-02 DIAGNOSIS — R79.89 ELEVATED TROPONIN: ICD-10-CM

## 2022-03-02 DIAGNOSIS — R07.89 CHEST PAIN, ATYPICAL: ICD-10-CM

## 2022-03-02 DIAGNOSIS — I10 UNCONTROLLED HYPERTENSION: ICD-10-CM

## 2022-03-02 DIAGNOSIS — I27.20 PULMONARY HYPERTENSION: ICD-10-CM

## 2022-03-02 DIAGNOSIS — U07.1 COVID-19: ICD-10-CM

## 2022-03-02 LAB
CV STRESS BASE HR: 74 BPM
DIASTOLIC BLOOD PRESSURE: 85 MMHG
NUC REST EJECTION FRACTION: 73
OHS CV CPX 85 PERCENT MAX PREDICTED HEART RATE MALE: 117
OHS CV CPX MAX PREDICTED HEART RATE: 138
OHS CV CPX PATIENT IS FEMALE: 0
OHS CV CPX PATIENT IS MALE: 1
OHS CV CPX PEAK DIASTOLIC BLOOD PRESSURE: 68 MMHG
OHS CV CPX PEAK HEAR RATE: 91 BPM
OHS CV CPX PEAK RATE PRESSURE PRODUCT: NORMAL
OHS CV CPX PEAK SYSTOLIC BLOOD PRESSURE: 118 MMHG
OHS CV CPX PERCENT MAX PREDICTED HEART RATE ACHIEVED: 66
OHS CV CPX RATE PRESSURE PRODUCT PRESENTING: NORMAL
SYSTOLIC BLOOD PRESSURE: 167 MMHG

## 2022-03-02 PROCEDURE — 93016 CV STRESS TEST SUPVJ ONLY: CPT | Mod: ,,, | Performed by: INTERNAL MEDICINE

## 2022-03-02 PROCEDURE — 78452 HT MUSCLE IMAGE SPECT MULT: CPT | Mod: 26,,, | Performed by: INTERNAL MEDICINE

## 2022-03-02 PROCEDURE — A9502 TC99M TETROFOSMIN: HCPCS

## 2022-03-02 PROCEDURE — 78452 STRESS TEST WITH MYOCARDIAL PERFUSION (CUPID ONLY): ICD-10-PCS | Mod: 26,,, | Performed by: INTERNAL MEDICINE

## 2022-03-02 PROCEDURE — 93018 STRESS TEST WITH MYOCARDIAL PERFUSION (CUPID ONLY): ICD-10-PCS | Mod: ,,, | Performed by: INTERNAL MEDICINE

## 2022-03-02 PROCEDURE — 93016 STRESS TEST WITH MYOCARDIAL PERFUSION (CUPID ONLY): ICD-10-PCS | Mod: ,,, | Performed by: INTERNAL MEDICINE

## 2022-03-02 PROCEDURE — 63600175 PHARM REV CODE 636 W HCPCS: Performed by: INTERNAL MEDICINE

## 2022-03-02 PROCEDURE — 93017 CV STRESS TEST TRACING ONLY: CPT

## 2022-03-02 PROCEDURE — 93018 CV STRESS TEST I&R ONLY: CPT | Mod: ,,, | Performed by: INTERNAL MEDICINE

## 2022-03-02 RX ORDER — REGADENOSON 0.08 MG/ML
0.4 INJECTION, SOLUTION INTRAVENOUS ONCE
Status: COMPLETED | OUTPATIENT
Start: 2022-03-02 | End: 2022-03-02

## 2022-03-02 RX ADMIN — REGADENOSON 0.4 MG: 0.08 INJECTION, SOLUTION INTRAVENOUS at 09:03

## 2022-03-09 ENCOUNTER — OFFICE VISIT (OUTPATIENT)
Dept: CARDIOLOGY | Facility: CLINIC | Age: 82
End: 2022-03-09
Payer: MEDICARE

## 2022-03-09 VITALS
RESPIRATION RATE: 15 BRPM | SYSTOLIC BLOOD PRESSURE: 138 MMHG | OXYGEN SATURATION: 97 % | DIASTOLIC BLOOD PRESSURE: 62 MMHG | BODY MASS INDEX: 26.88 KG/M2 | HEIGHT: 63 IN | WEIGHT: 151.69 LBS | HEART RATE: 87 BPM

## 2022-03-09 DIAGNOSIS — R07.89 CHEST PAIN, ATYPICAL: ICD-10-CM

## 2022-03-09 DIAGNOSIS — N18.32 STAGE 3B CHRONIC KIDNEY DISEASE: ICD-10-CM

## 2022-03-09 DIAGNOSIS — R79.89 ELEVATED TROPONIN: ICD-10-CM

## 2022-03-09 DIAGNOSIS — I10 UNCONTROLLED HYPERTENSION: Primary | ICD-10-CM

## 2022-03-09 DIAGNOSIS — E78.5 HYPERLIPIDEMIA, UNSPECIFIED HYPERLIPIDEMIA TYPE: ICD-10-CM

## 2022-03-09 DIAGNOSIS — I77.9 CAROTID ARTERY DISEASE, UNSPECIFIED LATERALITY, UNSPECIFIED TYPE: ICD-10-CM

## 2022-03-09 PROCEDURE — 99999 PR PBB SHADOW E&M-EST. PATIENT-LVL III: ICD-10-PCS | Mod: PBBFAC,,, | Performed by: INTERNAL MEDICINE

## 2022-03-09 PROCEDURE — 3075F PR MOST RECENT SYSTOLIC BLOOD PRESS GE 130-139MM HG: ICD-10-PCS | Mod: CPTII,S$GLB,, | Performed by: INTERNAL MEDICINE

## 2022-03-09 PROCEDURE — 99999 PR PBB SHADOW E&M-EST. PATIENT-LVL III: CPT | Mod: PBBFAC,,, | Performed by: INTERNAL MEDICINE

## 2022-03-09 PROCEDURE — 1126F AMNT PAIN NOTED NONE PRSNT: CPT | Mod: CPTII,S$GLB,, | Performed by: INTERNAL MEDICINE

## 2022-03-09 PROCEDURE — 3288F FALL RISK ASSESSMENT DOCD: CPT | Mod: CPTII,S$GLB,, | Performed by: INTERNAL MEDICINE

## 2022-03-09 PROCEDURE — 99214 OFFICE O/P EST MOD 30 MIN: CPT | Mod: S$GLB,,, | Performed by: INTERNAL MEDICINE

## 2022-03-09 PROCEDURE — 3075F SYST BP GE 130 - 139MM HG: CPT | Mod: CPTII,S$GLB,, | Performed by: INTERNAL MEDICINE

## 2022-03-09 PROCEDURE — 3078F PR MOST RECENT DIASTOLIC BLOOD PRESSURE < 80 MM HG: ICD-10-PCS | Mod: CPTII,S$GLB,, | Performed by: INTERNAL MEDICINE

## 2022-03-09 PROCEDURE — 1159F MED LIST DOCD IN RCRD: CPT | Mod: CPTII,S$GLB,, | Performed by: INTERNAL MEDICINE

## 2022-03-09 PROCEDURE — 3078F DIAST BP <80 MM HG: CPT | Mod: CPTII,S$GLB,, | Performed by: INTERNAL MEDICINE

## 2022-03-09 PROCEDURE — 1101F PR PT FALLS ASSESS DOC 0-1 FALLS W/OUT INJ PAST YR: ICD-10-PCS | Mod: CPTII,S$GLB,, | Performed by: INTERNAL MEDICINE

## 2022-03-09 PROCEDURE — 1159F PR MEDICATION LIST DOCUMENTED IN MEDICAL RECORD: ICD-10-PCS | Mod: CPTII,S$GLB,, | Performed by: INTERNAL MEDICINE

## 2022-03-09 PROCEDURE — 1101F PT FALLS ASSESS-DOCD LE1/YR: CPT | Mod: CPTII,S$GLB,, | Performed by: INTERNAL MEDICINE

## 2022-03-09 PROCEDURE — 1126F PR PAIN SEVERITY QUANTIFIED, NO PAIN PRESENT: ICD-10-PCS | Mod: CPTII,S$GLB,, | Performed by: INTERNAL MEDICINE

## 2022-03-09 PROCEDURE — 99214 PR OFFICE/OUTPT VISIT, EST, LEVL IV, 30-39 MIN: ICD-10-PCS | Mod: S$GLB,,, | Performed by: INTERNAL MEDICINE

## 2022-03-09 PROCEDURE — 3288F PR FALLS RISK ASSESSMENT DOCUMENTED: ICD-10-PCS | Mod: CPTII,S$GLB,, | Performed by: INTERNAL MEDICINE

## 2022-03-09 NOTE — PROGRESS NOTES
"Subjective:    Patient ID:  Jenny JONO George is a 81 y.o. male who presents for follow-up of No chief complaint on file.      HPI     Admitted 1/15/22  Mr. George is an 82 yo M with a h/o HTN, HLD, CKD presenting with cough and transient weakness, admitted for COVID and troponemia.      Reports that cough started ~ 5 days ago. He went to his PCP at the time and had a negative COVID test. He was given a "shot" and seemed to improve thereafter until a couple days ago. Started feeling fatigued and weak. Yesterday, he slumped down in the floor due to weakness and was unable to get up until his son arrived at his house to help. He had decreased appetite this week and PO intake. After eating dinner yesterday, reportedly felt back to normal today. However, his son was concerned and wanted him to be evaluated as he lives alone and generally gets around well without assistance. Went to urgent care and tested positive for COVID so came here. Today, notes persistent, intermittent dry cough. Denies any lightheadedness, sob, palpitations, chest pain, edema, fever, chills, rhinorrhea, sore throat, n/v, abd pain, dysuria, hematuria. Had a couple loose, non-bloody stools 2 days ago, but none since. No h/o heart disease.      Of note, recent admit in 12/2021 for bronchitis. Incidentally found to have a L rectus sheath hematoma that was felt to be spontaneous. Denies any problems since discharge.      80 y/o male admitted with Covid 19 infection and hypoxia.  Started on dexamethasone and remdesivir.  Supplemental oxygen and supportive care.  Minimal oxygen requirements and able to wean off oxygen.  Currently doing well on RA.  Patient was also noted to have elevated Troponin on presentation.  Flat pattern and probable demand ischemia secondary Covid infection in setting on CKD.  Patient did not have any chest pain.  Will continue ASA and Statin and follow up with Cardiology.  Patient has remained afebrile and hemodynamically stable.  He will be " discharged home to follow up with PCP.  Covid discharge instructions given in Frisian.  Troponin 0.25 flat pattern    Stress test 3/2/22    Abnormal myocardial perfusion scan.    There is a moderate to severe intensity, moderate to large sized, mostly reversible with some fixed areas defect in the inferior wall(s).    The gated perfusion images showed an ejection fraction of 73% at rest.    There is normal wall motion at rest and post stress.    The EKG portion of this study is negative for ischemia.    The patient reported no chest pain during the stress test.    There were no arrhythmias during stress.       Echo 1/16/22  · The left ventricle is normal in size with mild concentric hypertrophy and normal systolic function.  · The estimated ejection fraction is 70%.  · Normal right ventricular size with normal right ventricular systolic function.  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  · Indeterminate central venous pressure. Estimated PA systolic pressure is at least 24 mmHg.     Stress test 2016  No evidence of stress induced myocardial ischemia.      2/1/22 Still weak after discharge. Occasional sharp CP. Mild DONNELLY  BP stable     Lexiscan myoview for CP and recent troponin elevation during COVID infection  Continue Rx for HTN, pulmonary HTN, carotid disease    3/9/22 Denies CP or SOB    Review of Systems   Constitutional: Negative for decreased appetite.   HENT: Negative for ear discharge.    Eyes: Negative for blurred vision.   Endocrine: Negative for polyphagia.   Skin: Negative for nail changes.   Genitourinary: Negative for bladder incontinence.   Neurological: Negative for aphonia.   Psychiatric/Behavioral: Negative for hallucinations.   Allergic/Immunologic: Negative for hives.        Objective:    Physical Exam  Constitutional:       Appearance: He is well-developed.   HENT:      Head: Normocephalic and atraumatic.   Eyes:      Conjunctiva/sclera: Conjunctivae normal.      Pupils: Pupils are  equal, round, and reactive to light.   Cardiovascular:      Rate and Rhythm: Normal rate.      Pulses: Intact distal pulses.      Heart sounds: Normal heart sounds.   Pulmonary:      Effort: Pulmonary effort is normal.      Breath sounds: Normal breath sounds.   Abdominal:      General: Bowel sounds are normal.      Palpations: Abdomen is soft.   Musculoskeletal:         General: Normal range of motion.      Cervical back: Normal range of motion and neck supple.   Skin:     General: Skin is warm and dry.   Neurological:      Mental Status: He is alert and oriented to person, place, and time.           Assessment:       1. Uncontrolled hypertension    2. Hyperlipidemia, unspecified hyperlipidemia type    3. Carotid artery disease, unspecified laterality, unspecified type    4. Elevated troponin    5. Stage 3b chronic kidney disease    6. Chest pain, atypical         Plan:       Discussed abnormal stress findings. I have recommended a LHC and he declines due to to lack of symptoms  Will continue Rx for suspected CAD as well as HTN, HLD  OV 3 months

## 2022-06-07 ENCOUNTER — TELEPHONE (OUTPATIENT)
Dept: CARDIOLOGY | Facility: CLINIC | Age: 82
End: 2022-06-07
Payer: MEDICARE

## 2022-06-07 NOTE — TELEPHONE ENCOUNTER
I called to reschedule pt for his appt. I informed the pt that I was Dr. Becerra's nurse. He did not reply and then hung up on me. I called him again where he did not respond after I introduced myself, and hung up on me.

## 2022-07-12 ENCOUNTER — OFFICE VISIT (OUTPATIENT)
Dept: CARDIOLOGY | Facility: CLINIC | Age: 82
End: 2022-07-12
Payer: MEDICARE

## 2022-07-12 VITALS
RESPIRATION RATE: 19 BRPM | HEIGHT: 63 IN | SYSTOLIC BLOOD PRESSURE: 151 MMHG | HEART RATE: 77 BPM | DIASTOLIC BLOOD PRESSURE: 82 MMHG | WEIGHT: 153.13 LBS | BODY MASS INDEX: 27.13 KG/M2 | OXYGEN SATURATION: 96 %

## 2022-07-12 DIAGNOSIS — I10 UNCONTROLLED HYPERTENSION: ICD-10-CM

## 2022-07-12 DIAGNOSIS — R07.89 CHEST PAIN, ATYPICAL: ICD-10-CM

## 2022-07-12 DIAGNOSIS — E78.5 HYPERLIPIDEMIA, UNSPECIFIED HYPERLIPIDEMIA TYPE: ICD-10-CM

## 2022-07-12 DIAGNOSIS — I27.20 PULMONARY HYPERTENSION: Primary | ICD-10-CM

## 2022-07-12 DIAGNOSIS — I77.9 CAROTID ARTERY DISEASE, UNSPECIFIED LATERALITY, UNSPECIFIED TYPE: ICD-10-CM

## 2022-07-12 PROCEDURE — 3079F DIAST BP 80-89 MM HG: CPT | Mod: CPTII,S$GLB,, | Performed by: INTERNAL MEDICINE

## 2022-07-12 PROCEDURE — 3288F FALL RISK ASSESSMENT DOCD: CPT | Mod: CPTII,S$GLB,, | Performed by: INTERNAL MEDICINE

## 2022-07-12 PROCEDURE — 1101F PR PT FALLS ASSESS DOC 0-1 FALLS W/OUT INJ PAST YR: ICD-10-PCS | Mod: CPTII,S$GLB,, | Performed by: INTERNAL MEDICINE

## 2022-07-12 PROCEDURE — 99999 PR PBB SHADOW E&M-EST. PATIENT-LVL IV: CPT | Mod: PBBFAC,,, | Performed by: INTERNAL MEDICINE

## 2022-07-12 PROCEDURE — 3288F PR FALLS RISK ASSESSMENT DOCUMENTED: ICD-10-PCS | Mod: CPTII,S$GLB,, | Performed by: INTERNAL MEDICINE

## 2022-07-12 PROCEDURE — 93000 ELECTROCARDIOGRAM COMPLETE: CPT | Mod: S$GLB,,, | Performed by: INTERNAL MEDICINE

## 2022-07-12 PROCEDURE — 1126F AMNT PAIN NOTED NONE PRSNT: CPT | Mod: CPTII,S$GLB,, | Performed by: INTERNAL MEDICINE

## 2022-07-12 PROCEDURE — 3077F PR MOST RECENT SYSTOLIC BLOOD PRESSURE >= 140 MM HG: ICD-10-PCS | Mod: CPTII,S$GLB,, | Performed by: INTERNAL MEDICINE

## 2022-07-12 PROCEDURE — 99214 PR OFFICE/OUTPT VISIT, EST, LEVL IV, 30-39 MIN: ICD-10-PCS | Mod: S$GLB,,, | Performed by: INTERNAL MEDICINE

## 2022-07-12 PROCEDURE — 1159F PR MEDICATION LIST DOCUMENTED IN MEDICAL RECORD: ICD-10-PCS | Mod: CPTII,S$GLB,, | Performed by: INTERNAL MEDICINE

## 2022-07-12 PROCEDURE — 1126F PR PAIN SEVERITY QUANTIFIED, NO PAIN PRESENT: ICD-10-PCS | Mod: CPTII,S$GLB,, | Performed by: INTERNAL MEDICINE

## 2022-07-12 PROCEDURE — 1159F MED LIST DOCD IN RCRD: CPT | Mod: CPTII,S$GLB,, | Performed by: INTERNAL MEDICINE

## 2022-07-12 PROCEDURE — 99214 OFFICE O/P EST MOD 30 MIN: CPT | Mod: S$GLB,,, | Performed by: INTERNAL MEDICINE

## 2022-07-12 PROCEDURE — 93000 EKG 12-LEAD: ICD-10-PCS | Mod: S$GLB,,, | Performed by: INTERNAL MEDICINE

## 2022-07-12 PROCEDURE — 1101F PT FALLS ASSESS-DOCD LE1/YR: CPT | Mod: CPTII,S$GLB,, | Performed by: INTERNAL MEDICINE

## 2022-07-12 PROCEDURE — 3079F PR MOST RECENT DIASTOLIC BLOOD PRESSURE 80-89 MM HG: ICD-10-PCS | Mod: CPTII,S$GLB,, | Performed by: INTERNAL MEDICINE

## 2022-07-12 PROCEDURE — 3077F SYST BP >= 140 MM HG: CPT | Mod: CPTII,S$GLB,, | Performed by: INTERNAL MEDICINE

## 2022-07-12 PROCEDURE — 99999 PR PBB SHADOW E&M-EST. PATIENT-LVL IV: ICD-10-PCS | Mod: PBBFAC,,, | Performed by: INTERNAL MEDICINE

## 2022-07-12 RX ORDER — AMLODIPINE BESYLATE 5 MG/1
5 TABLET ORAL DAILY
Qty: 90 TABLET | Refills: 3 | Status: SHIPPED | OUTPATIENT
Start: 2022-07-12 | End: 2022-08-18

## 2022-07-12 NOTE — PROGRESS NOTES
"Subjective:    Patient ID:  Jenny JONO George is a 82 y.o. male who presents for follow-up of No chief complaint on file.      HPI     Admitted 1/15/22  Mr. George is an 82 yo M with a h/o HTN, HLD, CKD presenting with cough and transient weakness, admitted for COVID and troponemia.      Reports that cough started ~ 5 days ago. He went to his PCP at the time and had a negative COVID test. He was given a "shot" and seemed to improve thereafter until a couple days ago. Started feeling fatigued and weak. Yesterday, he slumped down in the floor due to weakness and was unable to get up until his son arrived at his house to help. He had decreased appetite this week and PO intake. After eating dinner yesterday, reportedly felt back to normal today. However, his son was concerned and wanted him to be evaluated as he lives alone and generally gets around well without assistance. Went to urgent care and tested positive for COVID so came here. Today, notes persistent, intermittent dry cough. Denies any lightheadedness, sob, palpitations, chest pain, edema, fever, chills, rhinorrhea, sore throat, n/v, abd pain, dysuria, hematuria. Had a couple loose, non-bloody stools 2 days ago, but none since. No h/o heart disease.      Of note, recent admit in 12/2021 for bronchitis. Incidentally found to have a L rectus sheath hematoma that was felt to be spontaneous. Denies any problems since discharge.      80 y/o male admitted with Covid 19 infection and hypoxia.  Started on dexamethasone and remdesivir.  Supplemental oxygen and supportive care.  Minimal oxygen requirements and able to wean off oxygen.  Currently doing well on RA.  Patient was also noted to have elevated Troponin on presentation.  Flat pattern and probable demand ischemia secondary Covid infection in setting on CKD.  Patient did not have any chest pain.  Will continue ASA and Statin and follow up with Cardiology.  Patient has remained afebrile and hemodynamically stable.  He will be " discharged home to follow up with PCP.  Covid discharge instructions given in Kiswahili.  Troponin 0.25 flat pattern     Stress test 3/2/22    Abnormal myocardial perfusion scan.    There is a moderate to severe intensity, moderate to large sized, mostly reversible with some fixed areas defect in the inferior wall(s).    The gated perfusion images showed an ejection fraction of 73% at rest.    There is normal wall motion at rest and post stress.    The EKG portion of this study is negative for ischemia.    The patient reported no chest pain during the stress test.    There were no arrhythmias during stress.        Echo 1/16/22  · The left ventricle is normal in size with mild concentric hypertrophy and normal systolic function.  · The estimated ejection fraction is 70%.  · Normal right ventricular size with normal right ventricular systolic function.  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  · Indeterminate central venous pressure. Estimated PA systolic pressure is at least 24 mmHg.     Stress test 2016  No evidence of stress induced myocardial ischemia.      2/1/22 Still weak after discharge. Occasional sharp CP. Mild DONNELLY  BP stable     Lexiscan myoview for CP and recent troponin elevation during COVID infection  Continue Rx for HTN, pulmonary HTN, carotid disease     3/9/22 Denies CP or SOB   Discussed abnormal stress findings. I have recommended a LHC and he declines due to to lack of symptoms  Will continue Rx for suspected CAD as well as HTN, HLD  OV 3 months     7/12/22  used. Denies CP or SOB  BP elevated  EKG NSR - ok    Review of Systems   Constitutional: Negative for decreased appetite.   HENT: Negative for ear discharge.    Eyes: Negative for blurred vision.   Endocrine: Negative for polyphagia.   Skin: Negative for nail changes.   Genitourinary: Negative for bladder incontinence.   Neurological: Negative for aphonia.   Psychiatric/Behavioral: Negative for hallucinations.    Allergic/Immunologic: Negative for hives.        Objective:    Physical Exam  Constitutional:       Appearance: He is well-developed.   HENT:      Head: Normocephalic and atraumatic.   Eyes:      Conjunctiva/sclera: Conjunctivae normal.      Pupils: Pupils are equal, round, and reactive to light.   Cardiovascular:      Rate and Rhythm: Normal rate.      Pulses: Intact distal pulses.      Heart sounds: Normal heart sounds.   Pulmonary:      Effort: Pulmonary effort is normal.      Breath sounds: Normal breath sounds.   Abdominal:      General: Bowel sounds are normal.      Palpations: Abdomen is soft.   Musculoskeletal:         General: Normal range of motion.      Cervical back: Normal range of motion and neck supple.   Skin:     General: Skin is warm and dry.   Neurological:      Mental Status: He is alert and oriented to person, place, and time.           Assessment:       1. Pulmonary hypertension    2. Carotid artery disease, unspecified laterality, unspecified type    3. Hyperlipidemia, unspecified hyperlipidemia type    4. Uncontrolled hypertension    5. Chest pain, atypical         Plan:       Add norvasc 5 qd for BP  Continues to request medical Rx for CAD     Will continue Rx for suspected CAD as well as HTN, HLD  OV 6 months

## 2023-03-12 ENCOUNTER — HOSPITAL ENCOUNTER (EMERGENCY)
Facility: HOSPITAL | Age: 83
Discharge: HOME | End: 2023-03-12
Attending: EMERGENCY MEDICINE
Payer: MEDICARE

## 2023-03-12 VITALS
TEMPERATURE: 98 F | HEART RATE: 76 BPM | BODY MASS INDEX: 25.69 KG/M2 | SYSTOLIC BLOOD PRESSURE: 180 MMHG | DIASTOLIC BLOOD PRESSURE: 90 MMHG | WEIGHT: 145 LBS | RESPIRATION RATE: 18 BRPM | OXYGEN SATURATION: 98 %

## 2023-03-12 DIAGNOSIS — M54.31 SCIATICA OF RIGHT SIDE: Primary | ICD-10-CM

## 2023-03-12 LAB
BILIRUB UR QL STRIP: NEGATIVE
CLARITY UR: CLEAR
COLOR UR: YELLOW
GLUCOSE UR QL STRIP: NEGATIVE
HGB UR QL STRIP: NEGATIVE
KETONES UR QL STRIP: NEGATIVE
LEUKOCYTE ESTERASE UR QL STRIP: NEGATIVE
NITRITE UR QL STRIP: NEGATIVE
PH UR STRIP: 7 [PH] (ref 5–8)
PROT UR QL STRIP: NEGATIVE
SP GR UR STRIP: 1.01 (ref 1–1.03)
URN SPEC COLLECT METH UR: NORMAL
UROBILINOGEN UR STRIP-ACNC: NEGATIVE EU/DL

## 2023-03-12 PROCEDURE — 25000003 PHARM REV CODE 250: Performed by: EMERGENCY MEDICINE

## 2023-03-12 PROCEDURE — 81003 URINALYSIS AUTO W/O SCOPE: CPT | Performed by: STUDENT IN AN ORGANIZED HEALTH CARE EDUCATION/TRAINING PROGRAM

## 2023-03-12 PROCEDURE — 99283 EMERGENCY DEPT VISIT LOW MDM: CPT

## 2023-03-12 RX ORDER — LIDOCAINE 50 MG/G
1 PATCH TOPICAL
Status: DISCONTINUED | OUTPATIENT
Start: 2023-03-12 | End: 2023-03-12 | Stop reason: HOSPADM

## 2023-03-12 RX ORDER — LIDOCAINE 50 MG/G
1 PATCH TOPICAL DAILY
Qty: 5 PATCH | Refills: 0 | Status: SHIPPED | OUTPATIENT
Start: 2023-03-12

## 2023-03-12 RX ADMIN — LIDOCAINE 1 PATCH: 50 PATCH CUTANEOUS at 12:03

## 2023-03-12 NOTE — DISCHARGE INSTRUCTIONS
Thank you for coming to our Emergency Department today. It is important to remember that some problems are difficult to diagnose and may not be found during your first visit. Be sure to follow up with your primary care doctor and review any labs/imaging that was performed with them. If you do not have a primary care doctor, you may contact the one listed on your discharge paperwork or you may also call the [facility] Appointment Desk at [phone] to schedule an appointment with one.     All medications may potentially have side effects and it is impossible to predict which medications may give you side effects. If you feel that you are having a negative effect of any medication you should immediately stop taking them and seek medical attention.    Return to the ER with any questions/concerns, new/concerning symptoms, worsening or failure to improve. Do not drive or make any important decisions for 24 hours if you have received any pain medications, sedatives or mood altering drugs during your ER visit.

## 2023-03-12 NOTE — ED PROVIDER NOTES
"Encounter Date: 3/12/2023    SCRIBE #1 NOTE: I, Olimpia Tejeda, am scribing for, and in the presence of,  Anthony Barkley MD. I have scribed the following portions of the note - Other sections scribed: HPI,ROS,PE.   SCRIBE #2 NOTE: I, Bhumi Alem, am scribing for, and in the presence of,  Anthony Barkley MD.   History     Chief Complaint   Patient presents with    Back Pain     Dx with sciatica 4 months ago on Naproxen 500mg for pain with no resolution; also has burning to right thigh     Jenny George is a 82 y.o. male, with a PMHx of HTN,hyperlipidemia, hyperthyroidism, who presents to the ED with chronic right leg and back pain. Patient reports chronic right leg and back "burning" pain associated w/ sciatica, persistent for the 4 months. Notes trouble ambulating and is only able to stand for 5 minutes secondary to pain. Has been attempting Tx w/ Naproxen without relief. He uses a cane or a walker to ambulate at baseline. No other exacerbating or alleviating factors. Denies fever, urinary or bowel issues, SOB, rash or lesions, or other associated symptoms. No recent falls/trauma. Per his social history, he lives at home alone and does not have family members around to help him w/ ADLs.     The history is provided by the patient. The history is limited by a language barrier (vietnmese speaking). A  was used ([id number]).   Review of patient's allergies indicates:  No Known Allergies  Past Medical History:   Diagnosis Date    BPH (benign prostatic hyperplasia)     Hyperlipidemia     Hypertension     Hyperthyroidism      No past surgical history on file.  No family history on file.  Social History     Tobacco Use    Smoking status: Never    Smokeless tobacco: Never   Substance Use Topics    Alcohol use: No    Drug use: No     Review of Systems   Constitutional:  Negative for chills and fever.   HENT:  Negative for congestion, rhinorrhea and sore throat.    Eyes:  Negative for visual disturbance.   Respiratory:  " Negative for cough and shortness of breath.    Cardiovascular:  Negative for chest pain.   Gastrointestinal:  Negative for abdominal pain, diarrhea, nausea and vomiting.   Genitourinary:  Negative for dysuria, frequency and hematuria.   Musculoskeletal:  Positive for arthralgias (R leg), back pain and gait problem (d/t pain).   Skin:  Negative for rash.   Neurological:  Negative for dizziness, weakness and headaches.     Physical Exam     Initial Vitals [03/12/23 1114]   BP Pulse Resp Temp SpO2   (!) 201/92 78 18 97.5 °F (36.4 °C) 97 %      MAP       --         Physical Exam    Nursing note and vitals reviewed.  Constitutional: He appears well-developed and well-nourished.   HENT:   Head: Normocephalic.   Right Ear: External ear normal.   Left Ear: External ear normal.   Mouth/Throat: Oropharynx is clear and moist.   Eyes: EOM are normal. Pupils are equal, round, and reactive to light.   Neck:   Normal range of motion.  Cardiovascular:  Normal rate, regular rhythm and intact distal pulses.           Good pulses   Pulmonary/Chest: Breath sounds normal. No respiratory distress.   Abdominal: Abdomen is soft. Bowel sounds are normal. He exhibits no distension. There is no abdominal tenderness.   No pulsatile abdominal masses.    Musculoskeletal:         General: No tenderness or edema. Normal range of motion.      Cervical back: Normal range of motion.     Neurological: He is alert and oriented to person, place, and time. He has normal strength. No sensory deficit. GCS score is 15. GCS eye subscore is 4. GCS verbal subscore is 5. GCS motor subscore is 6.   Able to stand. Good sensation. No saddle anesthesia.   Skin: Skin is warm and dry. Capillary refill takes less than 2 seconds.   Psychiatric: He has a normal mood and affect. Thought content normal.       ED Course   Procedures  Labs Reviewed   URINALYSIS, REFLEX TO URINE CULTURE    Narrative:     Specimen Source->Urine          Imaging Results              X-Ray  Lumbar Spine Ap And Lateral (Final result)  Result time 03/12/23 12:47:09      Final result by Eze Francis MD (03/12/23 12:47:09)                   Impression:      No acute abnormality.  No significant lumbar spine change since prior CT abdomen from 2021.      Electronically signed by: Eze Francis MD  Date:    03/12/2023  Time:    12:47               Narrative:    EXAMINATION:  XR LUMBAR SPINE AP AND LATERAL    CLINICAL HISTORY:  back pain;    TECHNIQUE:  AP, lateral and spot images were performed of the lumbar spine.    COMPARISON:  CT abdomen December 1, 2021    FINDINGS:  Grade 1 anterolisthesis of L4 over L5, unchanged since 2021.  Lumbar spine is otherwise normal in alignment.  Vertebral body heights are maintained.    No displaced fracture.  No suspicious bone lesion.    Moderate-severe multilevel lumbar spine degenerative disc disease and mild diffuse lumbar spine facet DJD.    Unremarkable bilateral SI joints.    Unremarkable soft tissues.                                       Medications - No data to display    Medical Decision Making:   History:   Old Medical Records: I decided to obtain old medical records.  Initial Assessment:   [age] year old patient presenting 2/2 back pain most consistent with musculoskeletal strain vs sciatica.  Patient was given Lidoderm for pain and discharged with Lidoderm patch.     Low suspicion for acute cord compression or cauda equina at this time, given presentation and symptoms, including epidural abscess or hematoma. Patient has no history of malignancy, active or distant history.  Patient has no unexplained weight loss. No recent fevers, rigors, malaise, or recent infection.  No history of IVDU or skin-popping.  Patient does not have any history concerning for saddle anesthesia/perianal sensory loss or complaining of decreased rectal tone. Patient does not have urinary retention or inability to control urine from overflow.  Patient has no tenderness  overlying spinous process. Patient has no focal weakness on examination.  X-ray due to age which does not show anything acute.  UA negative.  No CVA tenderness.  Neurovascular intact.  Outpatient follow-up with spine for potential pain control.  Given exam and history, low suspicion for cord compression, cauda equina, epidural abscess/hematoma. Distally neurovascuarly intact. Query likely musculoskeletal component versus sciatica. Discussed pain control, physical therapy and follow up with PMD. Cautious return precautions discussed w/ full understanding  Anthony Barkley        Clinical Tests:   Lab Tests: Ordered and Reviewed  Radiological Study: Ordered and Reviewed        Scribe Attestation:   Scribe #1: I performed the above scribed service and the documentation accurately describes the services I performed. I attest to the accuracy of the note.  Scribe #2: I performed the above scribed service and the documentation accurately describes the services I performed. I attest to the accuracy of the note.                   Clinical Impression:   Final diagnoses:  [M54.31] Sciatica of right side (Primary)        ED Disposition Condition    Discharge Stable        I, anthony barkley, personally performed the services described in this documentation. All medical record entries made by the scribe were at my direction and in my presence. I have reviewed the chart and agree that the record reflects my personal performance and is accurate and complete.   ED Prescriptions       Medication Sig Dispense Start Date End Date Auth. Provider    LIDOcaine (LIDODERM) 5 % Place 1 patch onto the skin once daily. Remove & Discard patch within 12 hours or as directed by MD 5 patch 3/12/2023 -- Anthony Barkley MD          Follow-up Information       Follow up With Specialties Details Why Contact Info    Son ALMA MD [facility] Schedule an appointment as soon as possible for a visit in 2 days  [address] [place] [address]  [place]  [id number]  [phone]               Anthony Barkley MD  03/12/23 1144

## 2023-05-23 ENCOUNTER — TELEPHONE (OUTPATIENT)
Dept: NEUROSURGERY | Facility: CLINIC | Age: 83
End: 2023-05-23
Payer: MEDICARE

## 2023-08-28 RX ORDER — AMLODIPINE BESYLATE 5 MG/1
TABLET ORAL
Qty: 90 TABLET | Refills: 3 | Status: SHIPPED | OUTPATIENT
Start: 2023-08-28

## 2024-03-04 NOTE — TELEPHONE ENCOUNTER
CARSON patient and he is not interested in scheduling an appointment w/Martha per the referral of Filippo for sciatica at this time.  V/U.    (M6) obeys commands

## 2025-06-04 ENCOUNTER — HOSPITAL ENCOUNTER (OUTPATIENT)
Facility: HOSPITAL | Age: 85
Discharge: HOME | End: 2025-06-05
Attending: EMERGENCY MEDICINE
Payer: MEDICARE

## 2025-06-04 DIAGNOSIS — I27.20 PULMONARY HYPERTENSION: ICD-10-CM

## 2025-06-04 DIAGNOSIS — N18.32 STAGE 3B CHRONIC KIDNEY DISEASE: ICD-10-CM

## 2025-06-04 DIAGNOSIS — R07.9 CHEST PAIN: ICD-10-CM

## 2025-06-04 DIAGNOSIS — E87.6 HYPOKALEMIA: ICD-10-CM

## 2025-06-04 DIAGNOSIS — R79.89 ELEVATED TROPONIN: ICD-10-CM

## 2025-06-04 DIAGNOSIS — R07.9 CHEST PAIN, UNSPECIFIED TYPE: ICD-10-CM

## 2025-06-04 DIAGNOSIS — J20.9 ACUTE BRONCHITIS, UNSPECIFIED ORGANISM: Primary | ICD-10-CM

## 2025-06-04 DIAGNOSIS — R93.89 ABNORMAL CT SCAN: ICD-10-CM

## 2025-06-04 DIAGNOSIS — D64.9 ANEMIA, UNSPECIFIED TYPE: ICD-10-CM

## 2025-06-04 DIAGNOSIS — R06.02 SOB (SHORTNESS OF BREATH): ICD-10-CM

## 2025-06-04 DIAGNOSIS — E78.2 MIXED HYPERLIPIDEMIA: ICD-10-CM

## 2025-06-04 DIAGNOSIS — E87.1 HYPONATREMIA: ICD-10-CM

## 2025-06-04 DIAGNOSIS — R06.02 SHORTNESS OF BREATH: ICD-10-CM

## 2025-06-04 PROBLEM — Z71.89 ADVANCED CARE PLANNING/COUNSELING DISCUSSION: Status: ACTIVE | Noted: 2025-06-04

## 2025-06-04 PROBLEM — J96.01 ACUTE HYPOXIC RESPIRATORY FAILURE: Status: ACTIVE | Noted: 2025-06-04

## 2025-06-04 PROBLEM — J96.01 ACUTE HYPOXIC RESPIRATORY FAILURE: Status: RESOLVED | Noted: 2025-06-04 | Resolved: 2025-06-04

## 2025-06-04 LAB
ABSOLUTE EOSINOPHIL (OHS): 0.03 K/UL
ABSOLUTE MONOCYTE (OHS): 0.93 K/UL (ref 0.3–1)
ABSOLUTE NEUTROPHIL COUNT (OHS): 6.01 K/UL (ref 1.8–7.7)
ALBUMIN SERPL BCP-MCNC: 4 G/DL (ref 3.5–5.2)
ALP SERPL-CCNC: 109 UNIT/L (ref 40–150)
ALT SERPL W/O P-5'-P-CCNC: 17 UNIT/L (ref 10–44)
ANION GAP (OHS): 14 MMOL/L (ref 8–16)
AST SERPL-CCNC: 39 UNIT/L (ref 11–45)
BASOPHILS # BLD AUTO: 0.01 K/UL
BASOPHILS NFR BLD AUTO: 0.1 %
BILIRUB SERPL-MCNC: 1.4 MG/DL (ref 0.1–1)
BILIRUB UR QL STRIP.AUTO: NEGATIVE
BNP SERPL-MCNC: 122 PG/ML (ref 0–99)
BUN SERPL-MCNC: 16 MG/DL (ref 8–23)
CALCIUM SERPL-MCNC: 9 MG/DL (ref 8.7–10.5)
CHLORIDE SERPL-SCNC: 97 MMOL/L (ref 95–110)
CLARITY UR: CLEAR
CO2 SERPL-SCNC: 22 MMOL/L (ref 23–29)
COLOR UR AUTO: YELLOW
CREAT SERPL-MCNC: 1.2 MG/DL (ref 0.5–1.4)
CTP QC/QA: YES
CTP QC/QA: YES
ERYTHROCYTE [DISTWIDTH] IN BLOOD BY AUTOMATED COUNT: 13.1 % (ref 11.5–14.5)
FERRITIN SERPL-MCNC: 347.8 NG/ML (ref 20–300)
GFR SERPLBLD CREATININE-BSD FMLA CKD-EPI: 59 ML/MIN/1.73/M2
GLUCOSE SERPL-MCNC: 155 MG/DL (ref 70–110)
GLUCOSE UR QL STRIP: NEGATIVE
HCT VFR BLD AUTO: 38.7 % (ref 40–54)
HGB BLD-MCNC: 13.3 GM/DL (ref 14–18)
HGB UR QL STRIP: ABNORMAL
HOLD SPECIMEN: NORMAL
IMM GRANULOCYTES # BLD AUTO: 0.02 K/UL (ref 0–0.04)
IMM GRANULOCYTES NFR BLD AUTO: 0.2 % (ref 0–0.5)
IRON SATN MFR SERPL: 23 % (ref 20–50)
IRON SERPL-MCNC: 61 UG/DL (ref 45–160)
KETONES UR QL STRIP: NEGATIVE
LACTATE SERPL-SCNC: 1.5 MMOL/L (ref 0.5–2.2)
LEUKOCYTE ESTERASE UR QL STRIP: NEGATIVE
LYMPHOCYTES # BLD AUTO: 1.21 K/UL (ref 1–4.8)
MCH RBC QN AUTO: 34.1 PG (ref 27–31)
MCHC RBC AUTO-ENTMCNC: 34.4 G/DL (ref 32–36)
MCV RBC AUTO: 99 FL (ref 82–98)
MICROSCOPIC COMMENT: ABNORMAL
NITRITE UR QL STRIP: NEGATIVE
NUCLEATED RBC (/100WBC) (OHS): 0 /100 WBC
PH UR STRIP: 7 [PH]
PLATELET # BLD AUTO: 154 K/UL (ref 150–450)
PMV BLD AUTO: 10.2 FL (ref 9.2–12.9)
POC MOLECULAR INFLUENZA A AGN: NEGATIVE
POC MOLECULAR INFLUENZA B AGN: NEGATIVE
POTASSIUM SERPL-SCNC: 3.4 MMOL/L (ref 3.5–5.1)
PROCALCITONIN SERPL-MCNC: 0.04 NG/ML
PROT SERPL-MCNC: 8.6 GM/DL (ref 6–8.4)
PROT UR QL STRIP: NEGATIVE
RBC # BLD AUTO: 3.9 M/UL (ref 4.6–6.2)
RBC #/AREA URNS AUTO: 12 /HPF (ref 0–4)
RELATIVE EOSINOPHIL (OHS): 0.4 %
RELATIVE LYMPHOCYTE (OHS): 14.7 % (ref 18–48)
RELATIVE MONOCYTE (OHS): 11.3 % (ref 4–15)
RELATIVE NEUTROPHIL (OHS): 73.3 % (ref 38–73)
SARS-COV-2 RDRP RESP QL NAA+PROBE: NEGATIVE
SODIUM SERPL-SCNC: 133 MMOL/L (ref 136–145)
SP GR UR STRIP: >=1.03
SQUAMOUS #/AREA URNS AUTO: 1 /HPF
T4 FREE SERPL-MCNC: 1.69 NG/DL (ref 0.71–1.51)
T4 FREE SERPL-MCNC: 1.69 NG/DL (ref 0.71–1.51)
TIBC SERPL-MCNC: 263 UG/DL (ref 250–450)
TRANSFERRIN SERPL-MCNC: 178 MG/DL (ref 200–375)
TROPONIN I SERPL DL<=0.01 NG/ML-MCNC: 0.04 NG/ML
TROPONIN I SERPL DL<=0.01 NG/ML-MCNC: 0.06 NG/ML
TROPONIN I SERPL DL<=0.01 NG/ML-MCNC: 0.07 NG/ML
TROPONIN I SERPL DL<=0.01 NG/ML-MCNC: 0.09 NG/ML
TSH SERPL-ACNC: 0.06 UIU/ML (ref 0.4–4)
UROBILINOGEN UR STRIP-ACNC: ABNORMAL EU/DL
WBC # BLD AUTO: 8.21 K/UL (ref 3.9–12.7)

## 2025-06-04 PROCEDURE — 63600175 PHARM REV CODE 636 W HCPCS

## 2025-06-04 PROCEDURE — 84484 ASSAY OF TROPONIN QUANT: CPT | Mod: 91

## 2025-06-04 PROCEDURE — 83605 ASSAY OF LACTIC ACID: CPT

## 2025-06-04 PROCEDURE — 25000003 PHARM REV CODE 250: Performed by: EMERGENCY MEDICINE

## 2025-06-04 PROCEDURE — 87502 INFLUENZA DNA AMP PROBE: CPT

## 2025-06-04 PROCEDURE — 94761 N-INVAS EAR/PLS OXIMETRY MLT: CPT

## 2025-06-04 PROCEDURE — G0378 HOSPITAL OBSERVATION PER HR: HCPCS

## 2025-06-04 PROCEDURE — 85025 COMPLETE CBC W/AUTO DIFF WBC: CPT

## 2025-06-04 PROCEDURE — 25000003 PHARM REV CODE 250

## 2025-06-04 PROCEDURE — 82728 ASSAY OF FERRITIN: CPT

## 2025-06-04 PROCEDURE — 80053 COMPREHEN METABOLIC PANEL: CPT | Performed by: EMERGENCY MEDICINE

## 2025-06-04 PROCEDURE — 84145 PROCALCITONIN (PCT): CPT | Performed by: EMERGENCY MEDICINE

## 2025-06-04 PROCEDURE — 83880 ASSAY OF NATRIURETIC PEPTIDE: CPT | Performed by: EMERGENCY MEDICINE

## 2025-06-04 PROCEDURE — 81003 URINALYSIS AUTO W/O SCOPE: CPT

## 2025-06-04 PROCEDURE — 82607 VITAMIN B-12: CPT

## 2025-06-04 PROCEDURE — 94640 AIRWAY INHALATION TREATMENT: CPT

## 2025-06-04 PROCEDURE — 84443 ASSAY THYROID STIM HORMONE: CPT

## 2025-06-04 PROCEDURE — 99285 EMERGENCY DEPT VISIT HI MDM: CPT | Mod: 25

## 2025-06-04 PROCEDURE — 87635 SARS-COV-2 COVID-19 AMP PRB: CPT | Performed by: EMERGENCY MEDICINE

## 2025-06-04 PROCEDURE — 36415 COLL VENOUS BLD VENIPUNCTURE: CPT

## 2025-06-04 PROCEDURE — 84484 ASSAY OF TROPONIN QUANT: CPT | Performed by: EMERGENCY MEDICINE

## 2025-06-04 PROCEDURE — 25500020 PHARM REV CODE 255: Performed by: EMERGENCY MEDICINE

## 2025-06-04 PROCEDURE — 93010 ELECTROCARDIOGRAM REPORT: CPT | Mod: ,,, | Performed by: INTERNAL MEDICINE

## 2025-06-04 PROCEDURE — 93005 ELECTROCARDIOGRAM TRACING: CPT

## 2025-06-04 PROCEDURE — 83540 ASSAY OF IRON: CPT

## 2025-06-04 PROCEDURE — 96372 THER/PROPH/DIAG INJ SC/IM: CPT

## 2025-06-04 PROCEDURE — 83036 HEMOGLOBIN GLYCOSYLATED A1C: CPT

## 2025-06-04 PROCEDURE — 25000242 PHARM REV CODE 250 ALT 637 W/ HCPCS

## 2025-06-04 PROCEDURE — 82746 ASSAY OF FOLIC ACID SERUM: CPT

## 2025-06-04 PROCEDURE — 99900035 HC TECH TIME PER 15 MIN (STAT)

## 2025-06-04 RX ORDER — TALC
6 POWDER (GRAM) TOPICAL NIGHTLY PRN
Status: DISCONTINUED | OUTPATIENT
Start: 2025-06-04 | End: 2025-06-05 | Stop reason: HOSPADM

## 2025-06-04 RX ORDER — POLYETHYLENE GLYCOL 3350 17 G/17G
17 POWDER, FOR SOLUTION ORAL DAILY
Status: DISCONTINUED | OUTPATIENT
Start: 2025-06-05 | End: 2025-06-05 | Stop reason: HOSPADM

## 2025-06-04 RX ORDER — LISINOPRIL 5 MG/1
10 TABLET ORAL DAILY
Status: DISCONTINUED | OUTPATIENT
Start: 2025-06-05 | End: 2025-06-05 | Stop reason: HOSPADM

## 2025-06-04 RX ORDER — IBUPROFEN 200 MG
16 TABLET ORAL
Status: DISCONTINUED | OUTPATIENT
Start: 2025-06-04 | End: 2025-06-05 | Stop reason: HOSPADM

## 2025-06-04 RX ORDER — GLUCAGON 1 MG
1 KIT INJECTION
Status: DISCONTINUED | OUTPATIENT
Start: 2025-06-04 | End: 2025-06-05 | Stop reason: HOSPADM

## 2025-06-04 RX ORDER — SODIUM CHLORIDE 0.9 % (FLUSH) 0.9 %
10 SYRINGE (ML) INJECTION EVERY 12 HOURS PRN
Status: DISCONTINUED | OUTPATIENT
Start: 2025-06-04 | End: 2025-06-05 | Stop reason: HOSPADM

## 2025-06-04 RX ORDER — ASPIRIN 81 MG/1
81 TABLET ORAL DAILY
Status: DISCONTINUED | OUTPATIENT
Start: 2025-06-05 | End: 2025-06-05 | Stop reason: HOSPADM

## 2025-06-04 RX ORDER — IPRATROPIUM BROMIDE AND ALBUTEROL SULFATE 2.5; .5 MG/3ML; MG/3ML
3 SOLUTION RESPIRATORY (INHALATION)
Status: DISCONTINUED | OUTPATIENT
Start: 2025-06-04 | End: 2025-06-05

## 2025-06-04 RX ORDER — HYDROCHLOROTHIAZIDE 25 MG/1
25 TABLET ORAL DAILY
Status: DISCONTINUED | OUTPATIENT
Start: 2025-06-05 | End: 2025-06-05

## 2025-06-04 RX ORDER — ATORVASTATIN CALCIUM 10 MG/1
20 TABLET, FILM COATED ORAL DAILY
Status: DISCONTINUED | OUTPATIENT
Start: 2025-06-05 | End: 2025-06-05 | Stop reason: HOSPADM

## 2025-06-04 RX ORDER — GUAIFENESIN 100 MG/5ML
200 LIQUID ORAL EVERY 6 HOURS
Status: DISCONTINUED | OUTPATIENT
Start: 2025-06-04 | End: 2025-06-05 | Stop reason: HOSPADM

## 2025-06-04 RX ORDER — IBUPROFEN 200 MG
24 TABLET ORAL
Status: DISCONTINUED | OUTPATIENT
Start: 2025-06-04 | End: 2025-06-05 | Stop reason: HOSPADM

## 2025-06-04 RX ORDER — ACETAMINOPHEN 325 MG/1
650 TABLET ORAL EVERY 4 HOURS PRN
Status: DISCONTINUED | OUTPATIENT
Start: 2025-06-04 | End: 2025-06-05 | Stop reason: HOSPADM

## 2025-06-04 RX ORDER — AMLODIPINE BESYLATE 5 MG/1
5 TABLET ORAL DAILY
Status: DISCONTINUED | OUTPATIENT
Start: 2025-06-05 | End: 2025-06-05 | Stop reason: HOSPADM

## 2025-06-04 RX ORDER — ONDANSETRON HYDROCHLORIDE 2 MG/ML
4 INJECTION, SOLUTION INTRAVENOUS EVERY 8 HOURS PRN
Status: DISCONTINUED | OUTPATIENT
Start: 2025-06-04 | End: 2025-06-05 | Stop reason: HOSPADM

## 2025-06-04 RX ORDER — CARVEDILOL 12.5 MG/1
25 TABLET ORAL 2 TIMES DAILY WITH MEALS
Status: DISCONTINUED | OUTPATIENT
Start: 2025-06-04 | End: 2025-06-05 | Stop reason: HOSPADM

## 2025-06-04 RX ORDER — ENOXAPARIN SODIUM 100 MG/ML
40 INJECTION SUBCUTANEOUS EVERY 24 HOURS
Status: DISCONTINUED | OUTPATIENT
Start: 2025-06-04 | End: 2025-06-05 | Stop reason: HOSPADM

## 2025-06-04 RX ORDER — NALOXONE HCL 0.4 MG/ML
0.02 VIAL (ML) INJECTION
Status: DISCONTINUED | OUTPATIENT
Start: 2025-06-04 | End: 2025-06-05 | Stop reason: HOSPADM

## 2025-06-04 RX ORDER — ASPIRIN 325 MG
325 TABLET ORAL
Status: COMPLETED | OUTPATIENT
Start: 2025-06-04 | End: 2025-06-04

## 2025-06-04 RX ADMIN — ASPIRIN 325 MG ORAL TABLET 325 MG: 325 PILL ORAL at 02:06

## 2025-06-04 RX ADMIN — IOHEXOL 100 ML: 350 INJECTION, SOLUTION INTRAVENOUS at 03:06

## 2025-06-04 RX ADMIN — ACETAMINOPHEN 650 MG: 325 TABLET ORAL at 10:06

## 2025-06-04 RX ADMIN — GUAIFENESIN 200 MG: 200 SOLUTION ORAL at 05:06

## 2025-06-04 RX ADMIN — ENOXAPARIN SODIUM 40 MG: 40 INJECTION SUBCUTANEOUS at 04:06

## 2025-06-04 RX ADMIN — IPRATROPIUM BROMIDE AND ALBUTEROL SULFATE 3 ML: 2.5; .5 SOLUTION RESPIRATORY (INHALATION) at 08:06

## 2025-06-04 RX ADMIN — CARVEDILOL 25 MG: 12.5 TABLET, FILM COATED ORAL at 10:06

## 2025-06-04 RX ADMIN — GUAIFENESIN 200 MG: 200 SOLUTION ORAL at 11:06

## 2025-06-04 NOTE — ED PROVIDER NOTES
Encounter Date: 6/4/2025    SCRIBE #1 NOTE: I, Josselyn Marino, am scribing for, and in the presence of,  Yary Li MD. I have scribed the following portions of the note - Other sections scribed: HPI, ROS, PE, MDM.       History     Chief Complaint   Patient presents with    Shortness of Breath     BIB by ems With productive cough and fatigue since yesterday. States went to PCP yesterday for same. Initial O2 sats 92% on RA with EMS. Given duonebs en route with improvement. Hypertensive,has not taken BP meds today.Using theraflu at home, last dose 0800. Pt also noted to have drainage from RIGHT eye, EMS noted eye drops at patients home.     Jenny George is a 85 y.o. male, with a PMHx of pulm and essential HTN, hyperthyroidism, CAD, CKD stage 3b, who presents to the ED with productive cough for 2 weeks. Patient reports the cough has been producing white sputum, also noting associated congestion, emesis, subjective fever, SOB, and right-sided rib area pain. Independent historian, EMS, reported that pt's O2 sat was 92% ORA, so pt was given duonebs en route with improvement. 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 States he saw his PCP yesterday, stating he was given Zofran.  Denies being on any antibiotics. Denies any known sick contacts. No other exacerbating or alleviating factors. Patient denies EtOH, tobacco, or illicit drug use.     History is limited as patient having difficulty communicating with .  Family at bedside states patient has been sick for 2 weeks.  Denies any recent antibiotics.  Right chest pain as well as shortness of breath.  Reports cough.  States that yesterday he was weak and almost had a syncopal episode.    The history is provided by the patient and the EMS personnel. The history is limited by a language barrier and the absence of a caregiver. A  was used  ().     Review of patient's allergies indicates:  No Known Allergies  Past Medical History:   Diagnosis Date    BPH (benign prostatic hyperplasia)     Hyperlipidemia     Hypertension     Hyperthyroidism      History reviewed. No pertinent surgical history.  No family history on file.  Social History[1]  Review of Systems   Constitutional:  Positive for fever. Negative for chills.   HENT:  Positive for congestion. Negative for drooling and voice change.    Eyes:  Negative for photophobia and visual disturbance.   Respiratory:  Positive for cough and shortness of breath. Negative for wheezing.    Cardiovascular:  Negative for chest pain and leg swelling.   Gastrointestinal:  Positive for vomiting. Negative for abdominal pain and diarrhea.   Genitourinary:  Negative for dysuria, frequency, hematuria and urgency.   Musculoskeletal:  Negative for myalgias and neck pain.   Skin:  Negative for rash and wound.   Neurological:  Negative for syncope, weakness and numbness.   Psychiatric/Behavioral:  Negative for agitation, confusion and suicidal ideas.    All other systems reviewed and are negative.      Physical Exam     Initial Vitals   BP Pulse Resp Temp SpO2   06/04/25 1249 06/04/25 1249 06/04/25 1249 06/04/25 1300 06/04/25 1249   (S) (!) 224/128 81 (!) 30 99 °F (37.2 °C) 98 %      MAP       --                Physical Exam    Nursing note and vitals reviewed.  Constitutional: He appears well-developed and well-nourished. He is not diaphoretic. No distress.   HENT:   Head: Normocephalic and atraumatic.   Right Ear: External ear normal.   Left Ear: External ear normal. Mouth/Throat: Oropharynx is clear and moist. No oropharyngeal exudate.   nasal discharge.    Eyes: Conjunctivae and EOM are normal. Pupils are equal, round, and reactive to light. Right eye exhibits no discharge. Left eye exhibits no discharge.   Clear drainage from the right eye pt states is chronic.    Neck: Neck supple. No JVD present.   Normal range  of motion.  Cardiovascular:  Normal rate, regular rhythm, normal heart sounds and intact distal pulses.     Exam reveals no gallop and no friction rub.       No murmur heard.  Pulmonary/Chest: Breath sounds normal. No respiratory distress. He has no wheezes. He has no rhonchi. He has no rales.   Course breath sounds with a wet sounding cough.    Abdominal: Abdomen is soft. Bowel sounds are normal. He exhibits no distension. There is no abdominal tenderness. There is no rebound and no guarding.   Musculoskeletal:         General: No tenderness or edema. Normal range of motion.      Cervical back: Normal range of motion and neck supple.     Lymphadenopathy:     He has no cervical adenopathy.   Neurological: He is alert and oriented to person, place, and time. He has normal strength. No cranial nerve deficit or sensory deficit. GCS score is 15. GCS eye subscore is 4. GCS verbal subscore is 5. GCS motor subscore is 6.   Slightly distractible with the  possibly secondary to being heard of hearing. Moves all extremities and carries on conversation. CN- II: PERRL; III/IV/VI: EOMI w/out evidence of nystagmus; V: no deficits appreciated to light touch bilateral face; VII: no facial weakness, no facial asymmetry. Eyebrow raise symmetric. Smile symmetric; IX/X: palate midline, and raises symmetrically; XI: shoulder shrug 5/5 bilaterally; XII: tongue is midline w/out asymmetry. Strength 5/5 to bilateral upper and lower extremities, sensation intact to light touch,   Skin: Skin is warm and dry. Capillary refill takes less than 2 seconds.   Psychiatric: He has a normal mood and affect. Thought content normal.         ED Course   Procedures  Labs Reviewed   URINALYSIS, REFLEX TO URINE CULTURE - Abnormal       Result Value    Color, UA Yellow      Appearance, UA Clear      pH, UA 7.0      Spec Grav UA >=1.030 (*)     Protein, UA Negative      Glucose, UA Negative      Ketones, UA Negative      Bilirubin, UA Negative       Blood, UA 1+ (*)     Nitrites, UA Negative      Urobilinogen, UA 2.0-3.0 (*)     Leukocyte Esterase, UA Negative     CBC WITH DIFFERENTIAL - Abnormal    WBC 8.21      RBC 3.90 (*)     HGB 13.3 (*)     HCT 38.7 (*)     MCV 99 (*)     MCH 34.1 (*)     MCHC 34.4      RDW 13.1      Platelet Count 154      MPV 10.2      Nucleated RBC 0      Neut % 73.3 (*)     Lymph % 14.7 (*)     Mono % 11.3      Eos % 0.4      Basophil % 0.1      Imm Grans % 0.2      Neut # 6.01      Lymph # 1.21      Mono # 0.93      Eos # 0.03      Baso # 0.01      Imm Grans # 0.02     TROPONIN I - Abnormal    Troponin-I 0.041 (*)    TROPONIN I - Abnormal    Troponin-I 0.069 (*)    B-TYPE NATRIURETIC PEPTIDE - Abnormal     (*)    COMPREHENSIVE METABOLIC PANEL - Abnormal    Sodium 133 (*)     Potassium 3.4 (*)     Chloride 97      CO2 22 (*)     Glucose 155 (*)     BUN 16      Creatinine 1.2      Calcium 9.0      Protein Total 8.6 (*)     Albumin 4.0      Bilirubin Total 1.4 (*)           AST 39      ALT 17      Anion Gap 14      eGFR 59 (*)     Narrative:     Specimen slightly hemolyzed.   TROPONIN I - Abnormal    Troponin-I 0.064 (*)    URINALYSIS MICROSCOPIC - Abnormal    RBC, UA 12 (*)     Squamous Epithelial Cells, UA 1      Microscopic Comment       TSH - Abnormal    TSH 0.055 (*)     Free T4 1.69 (*)    T4, FREE - Abnormal    Free T4 1.69 (*)    IRON AND TIBC - Abnormal    Iron Level 61      Transferrin 178 (*)     Iron Binding Capacity Total 263      Iron Saturation 23      Narrative:     Specimen slightly hemolyzed.   LACTIC ACID, PLASMA - Normal    Lactic Acid Level 1.5      Narrative:     Falsely low lactic acid results can be found in samples containing >=13.0 mg/dL total bilirubin and/or >=3.5 mg/dL direct bilirubin.    PROCALCITONIN - Normal    Procalcitonin 0.04     RESPIRATORY INFECTION PANEL (PCR), NASOPHARYNGEAL   CBC W/ AUTO DIFFERENTIAL    Narrative:     The following orders were created for panel order CBC auto  differential.  Procedure                               Abnormality         Status                     ---------                               -----------         ------                     CBC with Differential[[id number]]        Abnormal            Final result                 Please view results for these tests on the individual orders.   GREY TOP URINE HOLD    Extra Tube Hold for add-ons.     HEMOGLOBIN A1C   VITAMIN B12   FOLATE   SARS-COV-2 RDRP GENE    POC Rapid COVID Negative       Acceptable Yes     POCT INFLUENZA A/B MOLECULAR    POC Molecular Influenza A Ag Negative      POC Molecular Influenza B Ag Negative       Acceptable Yes            Imaging Results              CTA Chest Non-Coronary (PE Studies) (Final result)  Result time 06/04/25 15:54:09      Final result by Farooq Godinez MD (06/04/25 15:54:09)                   Impression:      No convincing evidence pulmonary thromboembolism.    Dilatation of the main pulmonary artery which may be seen with pulmonary arterial hypertension.    Mild stable dilatation of the ascending aorta.    Minimal amount of subsegmental atelectasis in the right lower lobe.    No acute process in the abdomen or pelvis.    Additional findings as above.      Electronically signed by: Farooq Godinez MD  Date:    06/04/2025  Time:    15:54               Narrative:    EXAMINATION:  CT ABDOMEN PELVIS WITH IV CONTRAST; CTA CHEST NON CORONARY (PE STUDIES)    CLINICAL HISTORY:  Nausea/vomiting;; Pulmonary embolism (PE) suspected, high prob;    TECHNIQUE:  CTA of the chest and CT of the abdomen and pelvis was obtained after the administration 100 cc of Omnipaque 350 intravenous contrast.  No oral contrast was administered.  Axial MIP images were obtained.  Coronal and sagittal reconstructions were also obtained    COMPARISON:  CTA chest from 01/15/2022, CT abdomen pelvis from 12/01/2021.    FINDINGS:  Pulmonary arteries: Study is adequate for the  evaluation of pulmonary thromboembolism.  No evidence of filling defect to the level of the segmental arteries.  No convincing evidence of pulmonary thromboembolism.    Dilatation of the main pulmonary artery measuring up to 3.9 cm similar to prior exam which may be seen with pulmonary arterial hypertension.    Thoracic soft tissues: Normal thyroid.    Aorta: Mild dilatation of the ascending aorta measuring up to 4.0 cm similar to prior exam.    Heart: Normal in size. No pericardial effusion. Severe calcific coronary atherosclerosis.    Jena/Mediastinum: No significant mediastinal, hilar, or axillary lymphadenopathy    Lungs: Trachea and bronchi are patent.  Small amount of atelectasis of the right lower lobe.    Liver: Normal in size and contour.  No focal hepatic lesion.    Gallbladder: No calcified gallstones.    Bile Ducts: No evidence of dilated ducts.    Pancreas: No mass or peripancreatic fat stranding.    Spleen: Unremarkable.    Stomach and duodenum: Unremarkable.    Adrenals: Stable 9 mm left adrenal nodule.    Kidneys/ Ureters: Kidneys are small in size.  Multiple subcentimeter hypodensities within the kidneys, too small to characterize favored to represent cysts.    Bladder: No evidence of wall thickening.    Reproductive organs: Unremarkable.    Bowel/Mesentery: Small bowel is normal in caliber with no evidence of obstruction.  No evidence of inflammation or wall thickening.  Colon demonstrates no focal wall thickening.    Lymph nodes: No lymphadenapathy.    Abdominal wall:  Bilateral fat containing inguinal hernias.  Fat containing umbilical hernia.    Vasculature: Ectasia of the abdominal aorta.  No aneurysm. Moderate calcific atherosclerosis.    Bones: No acute fracture. No suspicious osseous lesions.  Advanced degenerative changes of the right hip with flattening of the femoral head.  Findings are suggestive of avascular necrosis versus advanced degenerative changes.                                        CT Abdomen Pelvis With IV Contrast NO Oral Contrast (Final result)  Result time 06/04/25 15:54:09      Final result by Farooq Godinez MD (06/04/25 15:54:09)                   Impression:      No convincing evidence pulmonary thromboembolism.    Dilatation of the main pulmonary artery which may be seen with pulmonary arterial hypertension.    Mild stable dilatation of the ascending aorta.    Minimal amount of subsegmental atelectasis in the right lower lobe.    No acute process in the abdomen or pelvis.    Additional findings as above.      Electronically signed by: Farooq Godinez MD  Date:    06/04/2025  Time:    15:54               Narrative:    EXAMINATION:  CT ABDOMEN PELVIS WITH IV CONTRAST; CTA CHEST NON CORONARY (PE STUDIES)    CLINICAL HISTORY:  Nausea/vomiting;; Pulmonary embolism (PE) suspected, high prob;    TECHNIQUE:  CTA of the chest and CT of the abdomen and pelvis was obtained after the administration 100 cc of Omnipaque 350 intravenous contrast.  No oral contrast was administered.  Axial MIP images were obtained.  Coronal and sagittal reconstructions were also obtained    COMPARISON:  CTA chest from 01/15/2022, CT abdomen pelvis from 12/01/2021.    FINDINGS:  Pulmonary arteries: Study is adequate for the evaluation of pulmonary thromboembolism.  No evidence of filling defect to the level of the segmental arteries.  No convincing evidence of pulmonary thromboembolism.    Dilatation of the main pulmonary artery measuring up to 3.9 cm similar to prior exam which may be seen with pulmonary arterial hypertension.    Thoracic soft tissues: Normal thyroid.    Aorta: Mild dilatation of the ascending aorta measuring up to 4.0 cm similar to prior exam.    Heart: Normal in size. No pericardial effusion. Severe calcific coronary atherosclerosis.    Jena/Mediastinum: No significant mediastinal, hilar, or axillary lymphadenopathy    Lungs: Trachea and bronchi are patent.  Small amount of atelectasis  of the right lower lobe.    Liver: Normal in size and contour.  No focal hepatic lesion.    Gallbladder: No calcified gallstones.    Bile Ducts: No evidence of dilated ducts.    Pancreas: No mass or peripancreatic fat stranding.    Spleen: Unremarkable.    Stomach and duodenum: Unremarkable.    Adrenals: Stable 9 mm left adrenal nodule.    Kidneys/ Ureters: Kidneys are small in size.  Multiple subcentimeter hypodensities within the kidneys, too small to characterize favored to represent cysts.    Bladder: No evidence of wall thickening.    Reproductive organs: Unremarkable.    Bowel/Mesentery: Small bowel is normal in caliber with no evidence of obstruction.  No evidence of inflammation or wall thickening.  Colon demonstrates no focal wall thickening.    Lymph nodes: No lymphadenapathy.    Abdominal wall:  Bilateral fat containing inguinal hernias.  Fat containing umbilical hernia.    Vasculature: Ectasia of the abdominal aorta.  No aneurysm. Moderate calcific atherosclerosis.    Bones: No acute fracture. No suspicious osseous lesions.  Advanced degenerative changes of the right hip with flattening of the femoral head.  Findings are suggestive of avascular necrosis versus advanced degenerative changes.                                       X-Ray Chest AP Portable (Final result)  Result time 06/04/25 13:22:54      Final result by Spencer Maciel MD (06/04/25 13:22:54)                   Impression:      See above      Electronically signed by: Spencer Maciel MD  Date:    06/04/2025  Time:    13:22               Narrative:    EXAMINATION:  XR CHEST AP PORTABLE    CLINICAL HISTORY:  Chest Pain;    TECHNIQUE:  Single frontal view of the chest was performed.    COMPARISON:  No 01/15/2022 ne    FINDINGS:  Heart size normal.  The lungs are clear.  No pleural effusion                                       Medications   sodium chloride 0.9% flush 10 mL (has no administration in time range)   naloxone 0.4 mg/mL injection 0.02 mg  (has no administration in time range)   glucose chewable tablet 16 g (has no administration in time range)   glucose chewable tablet 24 g (has no administration in time range)   dextrose 50% injection 12.5 g (has no administration in time range)   dextrose 50% injection 25 g (has no administration in time range)   glucagon (human recombinant) injection 1 mg (has no administration in time range)   enoxaparin injection 40 mg (40 mg Subcutaneous Given 6/4/25 1657)   acetaminophen tablet 650 mg (has no administration in time range)   polyethylene glycol packet 17 g (has no administration in time range)   ondansetron injection 4 mg (has no administration in time range)   melatonin tablet 6 mg (has no administration in time range)   albuterol-ipratropium 2.5 mg-0.5 mg/3 mL nebulizer solution 3 mL (3 mLs Nebulization Given 6/4/25 2007)   guaiFENesin 100 mg/5 ml syrup 200 mg (200 mg Oral Given 6/4/25 1700)   amLODIPine tablet 5 mg (has no administration in time range)   aspirin EC tablet 81 mg (has no administration in time range)   atorvastatin tablet 20 mg (has no administration in time range)   lisinopriL tablet 10 mg (has no administration in time range)   hydroCHLOROthiazide tablet 25 mg (has no administration in time range)   carvediloL tablet 25 mg (has no administration in time range)   aspirin tablet 325 mg (325 mg Oral Given 6/4/25 1411)   iohexoL (OMNIPAQUE 350) injection 100 mL (100 mLs Intravenous Given 6/4/25 1510)     Medical Decision Making  Amount and/or Complexity of Data Reviewed  Independent Historian: EMS     Details: See HPI.   Labs: ordered. Decision-making details documented in ED Course.  Radiology: ordered. Decision-making details documented in ED Course.  ECG/medicine tests: ordered and independent interpretation performed. Decision-making details documented in ED Course.    Risk  OTC drugs.  Prescription drug management.    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 States symptoms have been ongoing for the last 1-2 weeks and worsening. He does report a productive cough of white sputum. Reports subjective fever at home. Reports nausea and vomiting x1 per day. Saw his primary care doctor yesterday but denies giving any medicines. Upon EMS arrival he was noted to be tachypneic, blood pressure 224/128 and in respiratory distress. He is given DuoNebs by EMS with improvement of his symptoms. His blood pressure improved without intervention. On exam he has some clear discharge from his right eye which he states is chronic from a traumatic injury 12 years ago and reports chronic blindness in that eye. Dry mucous membranes. Rhonchi bilaterally, productive sounding cough, he reports pain to the right chest.  Differential diagnosis includes was not limited to ACS, pneumonia, PE, metabolic derangement, pleurisy, viral infection, CHF.  EKG nonischemic. Lactic, COVID and flu negative. No leukocytosis. Anemia at baseline at 13.3. Troponin is elevated at 0.041. BNP is elevated at 122. Bili mildly elevated at 1.4 no other LFT elevations. Creatinine at baseline at 1.2. Mildly low sodium at 133. CT a with dilatation of the main pulmonary artery maybe concerning for pulmonary artery hypertension, no PE, stable dilation of the ascending aorta, atelectasis of the right lower lobe. No pneumonia noted. Given elevated troponin, shortness of breath with elevated BNP and increased pulmonary artery pressures likely as well as abnormal stress test last in 2022 I will bring patient in for observation for trending troponins, echo and consideration of stress test.  Case discussed with hospital medicine Dr. Alvarado who agrees to observe the patient for further management care.        Scribe Attestation:   Scribe #1: I performed the above scribed service and  the documentation accurately describes the services I performed. I attest to the accuracy of the note.        ED Course as of 06/04/25 2122 Wed Jun 04, 2025   1317 EKG 12-lead  Normal sinus rhythm at 80.  No ST elevation or significant depression.  T-wave inversions in V1.  Normal axis.  QTC is 456.  This EKG is similar to his previous EKG from 2022. [JT]      ED Course User Index  [JT] Yary Li MD                           Clinical Impression:  Final diagnoses:  [R06.02] SOB (shortness of breath)  [R07.9] Chest pain, unspecified type (Primary)  [R79.89] Elevated troponin  [D64.9] Anemia, unspecified type  [R93.89] Abnormal CT scan          ED Disposition Condition    Observation                I, Yary Li, personally performed the services described in this documentation. All medical record entries made by the scribe were at my direction and in my presence. I have reviewed the chart and agree that the record reflects my personal performance and is accurate and complete.           [1]   Social History  Tobacco Use    Smoking status: Never    Smokeless tobacco: Never   Substance Use Topics    Alcohol use: No    Drug use: No        Yary Li MD  06/04/25 2122

## 2025-06-04 NOTE — HPI
Jenny George is a 85 y.o. male with a pmh of hypertension, CKD 3, hyperlipidemia, pulmonary hypertension, CAD, hyperthyroidism presents to the hospital with complaints of worsening cough and shortness on breath for the past 2 weeks.    Patient is Colombian speaking and  ID # [id number] used for interpretation.  Patient states he has a worsening cough for the past 2 weeks with increased sputum production (thick white sputum).  Patient also has associated rhinorrhea, congestion, subjective fever.  Patient states that he lives alone and denies any recent sick contacts.  Patient was given duo neb EN route with improvement in respiratory status.  Denies any other alleviating exacerbating factors.  Patient denies alcohol, tobacco, or illicit drug use.  Patient denies headache, blurry vision, chest pain, nausea, vomiting, diarrhea, abdominal pain, or any other associated symptoms.    In the ED: Patient afebrile without leukocytosis, hypertensive on presentation 224/128 with respiratory rate 30, O2 sats ranging 91-98%, H/H 13.3/38.7, MCV 99, sodium 133, potassium 3.4, GFR 59, T bili 1.4, , troponin 0.041--> 0.069, normal lactic acid and procalcitonin levels, COVID/influenza negative, UA with no acute findings, chest x-ray with no acute cardiopulmonary process, CTA chest shows (1) no PE (2) dilation of the main pulmonary artery which may be seen in pulmonary arterial hypotension (3) mild stable dilation of ascending aorta (4) subsegmental atelectasis in the right lower lobe.  CT abdomen pelvis shows no acute process.  Patient given aspirin 325 mg in the ED and placed under observation for further medical management.

## 2025-06-04 NOTE — SUBJECTIVE & OBJECTIVE
Past Medical History:   Diagnosis Date    BPH (benign prostatic hyperplasia)     Hyperlipidemia     Hypertension     Hyperthyroidism        History reviewed. No pertinent surgical history.    Review of patient's allergies indicates:  No Known Allergies    No current facility-administered medications on file prior to encounter.     Current Outpatient Medications on File Prior to Encounter   Medication Sig    acetaminophen (TYLENOL) 500 MG tablet Take 500 mg by mouth every 6 (six) hours as needed.    amLODIPine (NORVASC) 5 MG tablet take 1 tablet by mouth once a day (uong gloria ngay, 1 yesika) (thuoc jordan jose)    ascorbic acid (VITAMIN C) 1000 MG tablet Take 1,000 mg by mouth once daily.    aspirin (ECOTRIN) 81 MG EC tablet Take 1 tablet (81 mg total) by mouth once daily.    atorvastatin (LIPITOR) 20 MG tablet     benazepril-hydrochlorthiazide (LOTENSIN HCT) 20-25 mg per tablet Take 1 tablet by mouth once daily.    carvedilol (COREG) 25 MG tablet Take 25 mg by mouth 2 (two) times daily with meals.    esomeprazole (NEXIUM) 40 MG capsule Take 1 capsule (40 mg total) by mouth before breakfast.    ferrous sulfate (FEOSOL) Tab tablet Take 1 tablet (1 each total) by mouth once daily.    latanoprost 0.005 % ophthalmic solution     LIDOcaine (LIDODERM) 5 % Place 1 patch onto the skin once daily. Remove & Discard patch within 12 hours or as directed by MD    mirtazapine (REMERON) 15 MG tablet Take 1 tablet (15 mg total) by mouth every evening.    timolol maleate 0.5% (TIMOPTIC-XR) 0.5 % SolG     vitamin A 44231 UNIT capsule Take 10,000 Units by mouth once daily.    vitamin E 600 UNIT capsule Take 600 Units by mouth once daily.     Family History    None       Tobacco Use    Smoking status: Never    Smokeless tobacco: Never   Substance and Sexual Activity    Alcohol use: No    Drug use: No    Sexual activity: Not on file     Review of Systems   Reason unable to perform ROS: ROS was performed and pertinent +s and -s are listed in  HPI.   Constitutional:  Positive for activity change, chills and fever.   HENT:  Positive for congestion and rhinorrhea.    Eyes:  Positive for discharge (Chronic right eye discharge).   Respiratory:  Positive for cough and shortness of breath.    Gastrointestinal: Negative.    Genitourinary: Negative.    Musculoskeletal: Negative.    Neurological: Negative.    Psychiatric/Behavioral: Negative.       Objective:     Vital Signs (Most Recent):  Temp: 99 °F (37.2 °C) (06/04/25 1300)  Pulse: 90 (06/04/25 1519)  Resp: (!) 30 (06/04/25 1249)  BP: (!) 170/80 (06/04/25 1519)  SpO2: (!) 94 % (06/04/25 1500) Vital Signs (24h Range):  Temp:  [99 °F (37.2 °C)] 99 °F (37.2 °C)  Pulse:  [79-90] 90  Resp:  [30] 30  SpO2:  [94 %-98 %] 94 %  BP: (159-224)/() 170/80     Weight: 65.8 kg (145 lb)  Body mass index is 26.52 kg/m².     Physical Exam  Constitutional:       General: He is not in acute distress.     Appearance: Normal appearance. He is not ill-appearing or diaphoretic.   HENT:      Head: Normocephalic and atraumatic.      Mouth/Throat:      Mouth: Mucous membranes are moist.   Eyes:      General:         Right eye: Discharge (Clear right eye discharge which is chronic) present.      Extraocular Movements: Extraocular movements intact.   Cardiovascular:      Rate and Rhythm: Normal rate and regular rhythm.      Pulses: Normal pulses.   Pulmonary:      Effort: Pulmonary effort is normal.      Breath sounds: Normal breath sounds. No wheezing or rales.      Comments: Speaking in full sentences on RA  Abdominal:      General: Abdomen is flat.   Musculoskeletal:      Right lower leg: No edema.      Left lower leg: No edema.   Skin:     General: Skin is warm.   Neurological:      General: No focal deficit present.      Mental Status: He is alert and oriented to person, place, and time. Mental status is at baseline.   Psychiatric:         Mood and Affect: Mood normal.         Behavior: Behavior normal.         Thought Content:  Thought content normal.                Significant Labs: All pertinent labs within the past 24 hours have been reviewed.  CBC:   Recent Labs   Lab 06/04/25  1302   WBC 8.21   HGB 13.3*   HCT 38.7*        CMP:   Recent Labs   Lab 06/04/25  1302   *   K 3.4*   CL 97   CO2 22*   *   BUN 16   CREATININE 1.2   CALCIUM 9.0   PROT 8.6*   ALBUMIN 4.0   BILITOT 1.4*   ALKPHOS 109   AST 39   ALT 17   ANIONGAP 14     Cardiac Markers:   Recent Labs   Lab 06/04/25  1302   *     Troponin:   Recent Labs   Lab 06/04/25  1302   TROPONINI 0.041*       Significant Imaging: I have reviewed all pertinent imaging results/findings within the past 24 hours.        Results for orders placed or performed during the hospital encounter of 06/04/25 (from the past 2160 hours)   CT Abdomen Pelvis With IV Contrast NO Oral Contrast    Narrative    EXAMINATION:  CT ABDOMEN PELVIS WITH IV CONTRAST; CTA CHEST NON CORONARY (PE STUDIES)    CLINICAL HISTORY:  Nausea/vomiting;; Pulmonary embolism (PE) suspected, high prob;    TECHNIQUE:  CTA of the chest and CT of the abdomen and pelvis was obtained after the administration 100 cc of Omnipaque 350 intravenous contrast.  No oral contrast was administered.  Axial MIP images were obtained.  Coronal and sagittal reconstructions were also obtained    COMPARISON:  CTA chest from 01/15/2022, CT abdomen pelvis from 12/01/2021.    FINDINGS:  Pulmonary arteries: Study is adequate for the evaluation of pulmonary thromboembolism.  No evidence of filling defect to the level of the segmental arteries.  No convincing evidence of pulmonary thromboembolism.    Dilatation of the main pulmonary artery measuring up to 3.9 cm similar to prior exam which may be seen with pulmonary arterial hypertension.    Thoracic soft tissues: Normal thyroid.    Aorta: Mild dilatation of the ascending aorta measuring up to 4.0 cm similar to prior exam.    Heart: Normal in size. No pericardial effusion. Severe  calcific coronary atherosclerosis.    Jena/Mediastinum: No significant mediastinal, hilar, or axillary lymphadenopathy    Lungs: Trachea and bronchi are patent.  Small amount of atelectasis of the right lower lobe.    Liver: Normal in size and contour.  No focal hepatic lesion.    Gallbladder: No calcified gallstones.    Bile Ducts: No evidence of dilated ducts.    Pancreas: No mass or peripancreatic fat stranding.    Spleen: Unremarkable.    Stomach and duodenum: Unremarkable.    Adrenals: Stable 9 mm left adrenal nodule.    Kidneys/ Ureters: Kidneys are small in size.  Multiple subcentimeter hypodensities within the kidneys, too small to characterize favored to represent cysts.    Bladder: No evidence of wall thickening.    Reproductive organs: Unremarkable.    Bowel/Mesentery: Small bowel is normal in caliber with no evidence of obstruction.  No evidence of inflammation or wall thickening.  Colon demonstrates no focal wall thickening.    Lymph nodes: No lymphadenapathy.    Abdominal wall:  Bilateral fat containing inguinal hernias.  Fat containing umbilical hernia.    Vasculature: Ectasia of the abdominal aorta.  No aneurysm. Moderate calcific atherosclerosis.    Bones: No acute fracture. No suspicious osseous lesions.  Advanced degenerative changes of the right hip with flattening of the femoral head.  Findings are suggestive of avascular necrosis versus advanced degenerative changes.      Impression    No convincing evidence pulmonary thromboembolism.    Dilatation of the main pulmonary artery which may be seen with pulmonary arterial hypertension.    Mild stable dilatation of the ascending aorta.    Minimal amount of subsegmental atelectasis in the right lower lobe.    No acute process in the abdomen or pelvis.    Additional findings as above.      Electronically signed by: Farooq Godinez MD  Date:    06/04/2025  Time:    15:54   CTA Chest Non-Coronary (PE Studies)    Narrative    EXAMINATION:  CT ABDOMEN  PELVIS WITH IV CONTRAST; CTA CHEST NON CORONARY (PE STUDIES)    CLINICAL HISTORY:  Nausea/vomiting;; Pulmonary embolism (PE) suspected, high prob;    TECHNIQUE:  CTA of the chest and CT of the abdomen and pelvis was obtained after the administration 100 cc of Omnipaque 350 intravenous contrast.  No oral contrast was administered.  Axial MIP images were obtained.  Coronal and sagittal reconstructions were also obtained    COMPARISON:  CTA chest from 01/15/2022, CT abdomen pelvis from 12/01/2021.    FINDINGS:  Pulmonary arteries: Study is adequate for the evaluation of pulmonary thromboembolism.  No evidence of filling defect to the level of the segmental arteries.  No convincing evidence of pulmonary thromboembolism.    Dilatation of the main pulmonary artery measuring up to 3.9 cm similar to prior exam which may be seen with pulmonary arterial hypertension.    Thoracic soft tissues: Normal thyroid.    Aorta: Mild dilatation of the ascending aorta measuring up to 4.0 cm similar to prior exam.    Heart: Normal in size. No pericardial effusion. Severe calcific coronary atherosclerosis.    Jena/Mediastinum: No significant mediastinal, hilar, or axillary lymphadenopathy    Lungs: Trachea and bronchi are patent.  Small amount of atelectasis of the right lower lobe.    Liver: Normal in size and contour.  No focal hepatic lesion.    Gallbladder: No calcified gallstones.    Bile Ducts: No evidence of dilated ducts.    Pancreas: No mass or peripancreatic fat stranding.    Spleen: Unremarkable.    Stomach and duodenum: Unremarkable.    Adrenals: Stable 9 mm left adrenal nodule.    Kidneys/ Ureters: Kidneys are small in size.  Multiple subcentimeter hypodensities within the kidneys, too small to characterize favored to represent cysts.    Bladder: No evidence of wall thickening.    Reproductive organs: Unremarkable.    Bowel/Mesentery: Small bowel is normal in caliber with no evidence of obstruction.  No evidence of  inflammation or wall thickening.  Colon demonstrates no focal wall thickening.    Lymph nodes: No lymphadenapathy.    Abdominal wall:  Bilateral fat containing inguinal hernias.  Fat containing umbilical hernia.    Vasculature: Ectasia of the abdominal aorta.  No aneurysm. Moderate calcific atherosclerosis.    Bones: No acute fracture. No suspicious osseous lesions.  Advanced degenerative changes of the right hip with flattening of the femoral head.  Findings are suggestive of avascular necrosis versus advanced degenerative changes.      Impression    No convincing evidence pulmonary thromboembolism.    Dilatation of the main pulmonary artery which may be seen with pulmonary arterial hypertension.    Mild stable dilatation of the ascending aorta.    Minimal amount of subsegmental atelectasis in the right lower lobe.    No acute process in the abdomen or pelvis.    Additional findings as above.      Electronically signed by: Farooq Godinez MD  Date:    06/04/2025  Time:    15:54

## 2025-06-05 VITALS
RESPIRATION RATE: 18 BRPM | TEMPERATURE: 98 F | HEIGHT: 66 IN | BODY MASS INDEX: 23.74 KG/M2 | WEIGHT: 147.69 LBS | HEART RATE: 83 BPM | SYSTOLIC BLOOD PRESSURE: 134 MMHG | DIASTOLIC BLOOD PRESSURE: 67 MMHG | OXYGEN SATURATION: 91 %

## 2025-06-05 PROBLEM — J96.01 ACUTE HYPOXIC RESPIRATORY FAILURE: Status: RESOLVED | Noted: 2025-06-04 | Resolved: 2025-06-05

## 2025-06-05 PROBLEM — E87.6 HYPOKALEMIA: Status: RESOLVED | Noted: 2025-06-04 | Resolved: 2025-06-05

## 2025-06-05 PROBLEM — E87.1 HYPONATREMIA: Status: RESOLVED | Noted: 2021-12-01 | Resolved: 2025-06-05

## 2025-06-05 LAB
ABSOLUTE EOSINOPHIL (OHS): 0.01 K/UL
ABSOLUTE MONOCYTE (OHS): 0.86 K/UL (ref 0.3–1)
ABSOLUTE NEUTROPHIL COUNT (OHS): 4.81 K/UL (ref 1.8–7.7)
ALBUMIN SERPL BCP-MCNC: 3.5 G/DL (ref 3.5–5.2)
ALP SERPL-CCNC: 98 UNIT/L (ref 40–150)
ALT SERPL W/O P-5'-P-CCNC: 18 UNIT/L (ref 10–44)
ANION GAP (OHS): 14 MMOL/L (ref 8–16)
ANION GAP (OHS): 17 MMOL/L (ref 8–16)
AORTIC SIZE INDEX (SOV): 2.1 CM/M2
AORTIC SIZE INDEX: 2.3 CM/M2
ASCENDING AORTA: 4 CM
AST SERPL-CCNC: 34 UNIT/L (ref 11–45)
AV INDEX (PROSTH): 0.71
AV MEAN GRADIENT: 3 MMHG
AV PEAK GRADIENT: 5 MMHG
AV VALVE AREA BY VELOCITY RATIO: 3.7 CM²
AV VALVE AREA: 3.2 CM²
AV VELOCITY RATIO: 0.82
BASOPHILS # BLD AUTO: 0.02 K/UL
BASOPHILS NFR BLD AUTO: 0.3 %
BILIRUB SERPL-MCNC: 1.3 MG/DL (ref 0.1–1)
BSA FOR ECHO PROCEDURE: 1.77 M2
BUN SERPL-MCNC: 20 MG/DL (ref 8–23)
BUN SERPL-MCNC: 22 MG/DL (ref 8–23)
CALCIUM SERPL-MCNC: 8.8 MG/DL (ref 8.7–10.5)
CALCIUM SERPL-MCNC: 9 MG/DL (ref 8.7–10.5)
CHLORIDE SERPL-SCNC: 95 MMOL/L (ref 95–110)
CHLORIDE SERPL-SCNC: 96 MMOL/L (ref 95–110)
CHOLEST SERPL-MCNC: 100 MG/DL (ref 120–199)
CHOLEST/HDLC SERPL: 2.7 {RATIO} (ref 2–5)
CO2 SERPL-SCNC: 24 MMOL/L (ref 23–29)
CO2 SERPL-SCNC: 24 MMOL/L (ref 23–29)
CREAT SERPL-MCNC: 1.2 MG/DL (ref 0.5–1.4)
CREAT SERPL-MCNC: 1.2 MG/DL (ref 0.5–1.4)
CV ECHO LV RWT: 0.52 CM
DOP CALC AO PEAK VEL: 1.1 M/S
DOP CALC AO VTI: 22 CM
DOP CALC LVOT AREA: 4.5 CM2
DOP CALC LVOT DIAMETER: 2.4 CM
DOP CALC LVOT PEAK VEL: 0.9 M/S
DOP CALC LVOT STROKE VOLUME: 71 CM3
DOP CALCLVOT PEAK VEL VTI: 15.7 CM
E WAVE DECELERATION TIME: 108 MSEC
E/A RATIO: 0.52
EAG (OHS): 128 MG/DL (ref 68–131)
ECHO LV POSTERIOR WALL: 1.1 CM (ref 0.6–1.1)
ERYTHROCYTE [DISTWIDTH] IN BLOOD BY AUTOMATED COUNT: 13 % (ref 11.5–14.5)
FOLATE SERPL-MCNC: 36.2 NG/ML (ref 4–24)
FRACTIONAL SHORTENING: 26.2 % (ref 28–44)
GFR SERPLBLD CREATININE-BSD FMLA CKD-EPI: 59 ML/MIN/1.73/M2
GFR SERPLBLD CREATININE-BSD FMLA CKD-EPI: 59 ML/MIN/1.73/M2
GLUCOSE SERPL-MCNC: 101 MG/DL (ref 70–110)
GLUCOSE SERPL-MCNC: 82 MG/DL (ref 70–110)
HBA1C MFR BLD: 6.1 % (ref 4–5.6)
HCT VFR BLD AUTO: 35.6 % (ref 40–54)
HDLC SERPL-MCNC: 37 MG/DL (ref 40–75)
HDLC SERPL: 37 % (ref 20–50)
HGB BLD-MCNC: 12.4 GM/DL (ref 14–18)
HOLD SPECIMEN: NORMAL
IMM GRANULOCYTES # BLD AUTO: 0.02 K/UL (ref 0–0.04)
IMM GRANULOCYTES NFR BLD AUTO: 0.3 % (ref 0–0.5)
INTERVENTRICULAR SEPTUM: 1.2 CM (ref 0.6–1.1)
IVC DIAMETER: 1.09 CM
IVRT: 91 MSEC
LA MAJOR: 3.9 CM
LA MINOR: 4 CM
LDLC SERPL CALC-MCNC: 46.8 MG/DL (ref 63–159)
LEFT ATRIUM SIZE: 4.4 CM
LEFT INTERNAL DIMENSION IN SYSTOLE: 3.1 CM (ref 2.1–4)
LEFT VENTRICLE DIASTOLIC VOLUME INDEX: 46.02 ML/M2
LEFT VENTRICLE DIASTOLIC VOLUME: 81 ML
LEFT VENTRICLE MASS INDEX: 95.1 G/M2
LEFT VENTRICLE SYSTOLIC VOLUME INDEX: 21.6 ML/M2
LEFT VENTRICLE SYSTOLIC VOLUME: 38 ML
LEFT VENTRICULAR INTERNAL DIMENSION IN DIASTOLE: 4.2 CM (ref 3.5–6)
LEFT VENTRICULAR MASS: 167.4 G
LVED V (TEICH): 80.57 ML
LVES V (TEICH): 37.8 ML
LVOT MG: 1.56 MMHG
LVOT MV: 0.58 CM/S
LYMPHOCYTES # BLD AUTO: 1.33 K/UL (ref 1–4.8)
MAGNESIUM SERPL-MCNC: 1.6 MG/DL (ref 1.6–2.6)
MAGNESIUM SERPL-MCNC: 2.3 MG/DL (ref 1.6–2.6)
MCH RBC QN AUTO: 34 PG (ref 27–31)
MCHC RBC AUTO-ENTMCNC: 34.8 G/DL (ref 32–36)
MCV RBC AUTO: 98 FL (ref 82–98)
MV PEAK A VEL: 0.97 M/S
MV PEAK E VEL: 0.5 M/S
MV STENOSIS PRESSURE HALF TIME: 31.18 MS
MV VALVE AREA P 1/2 METHOD: 7.06 CM2
NONHDLC SERPL-MCNC: 63 MG/DL
NUCLEATED RBC (/100WBC) (OHS): 0 /100 WBC
OHS CV RV/LV RATIO: 0.67 CM
OHS QRS DURATION: 96 MS
OHS QTC CALCULATION: 456 MS
PHOSPHATE SERPL-MCNC: 3.7 MG/DL (ref 2.7–4.5)
PISA TR MAX VEL: 3 M/S
PLATELET # BLD AUTO: 155 K/UL (ref 150–450)
PMV BLD AUTO: 11 FL (ref 9.2–12.9)
POTASSIUM SERPL-SCNC: 2.8 MMOL/L (ref 3.5–5.1)
POTASSIUM SERPL-SCNC: 3.5 MMOL/L (ref 3.5–5.1)
PROT SERPL-MCNC: 7.8 GM/DL (ref 6–8.4)
PV PEAK GRADIENT: 4 MMHG
PV PEAK VELOCITY: 1 M/S
RA MAJOR: 3.99 CM
RA PRESSURE ESTIMATED: 3 MMHG
RBC # BLD AUTO: 3.65 M/UL (ref 4.6–6.2)
RELATIVE EOSINOPHIL (OHS): 0.1 %
RELATIVE LYMPHOCYTE (OHS): 18.9 % (ref 18–48)
RELATIVE MONOCYTE (OHS): 12.2 % (ref 4–15)
RELATIVE NEUTROPHIL (OHS): 68.2 % (ref 38–73)
RIGHT VENTRICLE DIASTOLIC BASEL DIMENSION: 2.8 CM
RIGHT VENTRICULAR END-DIASTOLIC DIMENSION: 2.76 CM
RV TB RVSP: 6 MMHG
SINUS: 3.71 CM
SODIUM SERPL-SCNC: 134 MMOL/L (ref 136–145)
SODIUM SERPL-SCNC: 136 MMOL/L (ref 136–145)
STJ: 3.4 CM
TR MAX PG: 37 MMHG
TRIGL SERPL-MCNC: 81 MG/DL (ref 30–150)
TV PEAK GRADIENT: 1 MMHG
TV REST PULMONARY ARTERY PRESSURE: 39 MMHG
VIT B12 SERPL-MCNC: 941 PG/ML (ref 210–950)
WBC # BLD AUTO: 7.05 K/UL (ref 3.9–12.7)
Z-SCORE OF LEFT VENTRICULAR DIMENSION IN END DIASTOLE: -1.47
Z-SCORE OF LEFT VENTRICULAR DIMENSION IN END SYSTOLE: 0.23

## 2025-06-05 PROCEDURE — 94799 UNLISTED PULMONARY SVC/PX: CPT

## 2025-06-05 PROCEDURE — 94761 N-INVAS EAR/PLS OXIMETRY MLT: CPT

## 2025-06-05 PROCEDURE — 36415 COLL VENOUS BLD VENIPUNCTURE: CPT

## 2025-06-05 PROCEDURE — 63600175 PHARM REV CODE 636 W HCPCS: Performed by: HOSPITALIST

## 2025-06-05 PROCEDURE — 36415 COLL VENOUS BLD VENIPUNCTURE: CPT | Performed by: HOSPITALIST

## 2025-06-05 PROCEDURE — 83735 ASSAY OF MAGNESIUM: CPT | Mod: 91 | Performed by: HOSPITALIST

## 2025-06-05 PROCEDURE — G0378 HOSPITAL OBSERVATION PER HR: HCPCS

## 2025-06-05 PROCEDURE — 99900035 HC TECH TIME PER 15 MIN (STAT)

## 2025-06-05 PROCEDURE — 25000242 PHARM REV CODE 250 ALT 637 W/ HCPCS

## 2025-06-05 PROCEDURE — 83735 ASSAY OF MAGNESIUM: CPT

## 2025-06-05 PROCEDURE — 85025 COMPLETE CBC W/AUTO DIFF WBC: CPT

## 2025-06-05 PROCEDURE — 94640 AIRWAY INHALATION TREATMENT: CPT | Mod: XB

## 2025-06-05 PROCEDURE — 80061 LIPID PANEL: CPT

## 2025-06-05 PROCEDURE — 96366 THER/PROPH/DIAG IV INF ADDON: CPT

## 2025-06-05 PROCEDURE — 84100 ASSAY OF PHOSPHORUS: CPT

## 2025-06-05 PROCEDURE — 25000003 PHARM REV CODE 250: Performed by: HOSPITALIST

## 2025-06-05 PROCEDURE — 25000003 PHARM REV CODE 250

## 2025-06-05 PROCEDURE — 96365 THER/PROPH/DIAG IV INF INIT: CPT

## 2025-06-05 PROCEDURE — 80053 COMPREHEN METABOLIC PANEL: CPT

## 2025-06-05 PROCEDURE — 82310 ASSAY OF CALCIUM: CPT | Performed by: HOSPITALIST

## 2025-06-05 RX ORDER — POTASSIUM CHLORIDE 20 MEQ/1
40 TABLET, EXTENDED RELEASE ORAL EVERY 4 HOURS
Status: COMPLETED | OUTPATIENT
Start: 2025-06-05 | End: 2025-06-05

## 2025-06-05 RX ORDER — BENZONATATE 100 MG/1
100 CAPSULE ORAL 3 TIMES DAILY PRN
Qty: 30 CAPSULE | Refills: 0 | Status: SHIPPED | OUTPATIENT
Start: 2025-06-05 | End: 2025-06-15

## 2025-06-05 RX ORDER — IPRATROPIUM BROMIDE AND ALBUTEROL SULFATE 2.5; .5 MG/3ML; MG/3ML
3 SOLUTION RESPIRATORY (INHALATION) EVERY 4 HOURS PRN
Status: DISCONTINUED | OUTPATIENT
Start: 2025-06-05 | End: 2025-06-05 | Stop reason: HOSPADM

## 2025-06-05 RX ORDER — MAGNESIUM SULFATE HEPTAHYDRATE 40 MG/ML
2 INJECTION, SOLUTION INTRAVENOUS ONCE
Status: COMPLETED | OUTPATIENT
Start: 2025-06-05 | End: 2025-06-05

## 2025-06-05 RX ADMIN — ATORVASTATIN CALCIUM 20 MG: 10 TABLET, FILM COATED ORAL at 08:06

## 2025-06-05 RX ADMIN — ASPIRIN 81 MG: 81 TABLET, COATED ORAL at 08:06

## 2025-06-05 RX ADMIN — CARVEDILOL 25 MG: 12.5 TABLET, FILM COATED ORAL at 08:06

## 2025-06-05 RX ADMIN — AMLODIPINE BESYLATE 5 MG: 5 TABLET ORAL at 08:06

## 2025-06-05 RX ADMIN — GUAIFENESIN 200 MG: 200 SOLUTION ORAL at 11:06

## 2025-06-05 RX ADMIN — POTASSIUM CHLORIDE 40 MEQ: 1500 TABLET, EXTENDED RELEASE ORAL at 02:06

## 2025-06-05 RX ADMIN — LISINOPRIL 10 MG: 5 TABLET ORAL at 08:06

## 2025-06-05 RX ADMIN — IPRATROPIUM BROMIDE AND ALBUTEROL SULFATE 3 ML: 2.5; .5 SOLUTION RESPIRATORY (INHALATION) at 07:06

## 2025-06-05 RX ADMIN — MAGNESIUM SULFATE HEPTAHYDRATE 2 G: 40 INJECTION, SOLUTION INTRAVENOUS at 10:06

## 2025-06-05 RX ADMIN — HYDROCHLOROTHIAZIDE 25 MG: 25 TABLET ORAL at 08:06

## 2025-06-05 RX ADMIN — POTASSIUM CHLORIDE 40 MEQ: 1500 TABLET, EXTENDED RELEASE ORAL at 10:06

## 2025-06-05 RX ADMIN — GUAIFENESIN 200 MG: 200 SOLUTION ORAL at 06:06

## 2025-06-05 NOTE — ASSESSMENT & PLAN NOTE
Patient's BNP slightly elevated 122.  CTA with dilatation of the main pulmonary artery which may be seen with the Pulmonary hypertension.  Patient has echocardiogram from 01/16/2022 with normal systolic function and EF 70% and normal PA pressure.  Repeat echocardiogram ordered

## 2025-06-05 NOTE — ASSESSMENT & PLAN NOTE
Anemia is likely due to chronic disease due to Chronic Kidney Disease. Most recent hemoglobin and hematocrit are listed below.  Recent Labs     06/04/25  1302 06/05/25  0426   HGB 13.3* 12.4*   HCT 38.7* 35.6*     Plan  - Monitor serial CBC: Daily  - Transfuse PRBC if patient becomes hemodynamically unstable, symptomatic or H/H drops below 7/21.  - Patient has not received any PRBC transfusions to date  - Patient's anemia is currently stable  - B12, Iron, folate levels ordered

## 2025-06-05 NOTE — ASSESSMENT & PLAN NOTE
Creatine stable for now. BMP reviewed- noted Estimated Creatinine Clearance: 39.9 mL/min (based on SCr of 1.2 mg/dL). according to latest data. Based on current GFR, CKD stage is stage 3 - GFR 30-59.  Monitor UOP and serial BMP and adjust therapy as needed. Renally dose meds. Avoid nephrotoxic medications and procedures.

## 2025-06-05 NOTE — HOSPITAL COURSE
Mr. George was placed in observation after presenting with cough and dyspnea for the past week or 2.  Initially in the ED he was found to be markedly hypertensive.  Chest imaging including CTA chest clear for pneumonia or PE or other concerning finding.  CT abdomen pelvis also unremarkable for acute abnormality.  Minimal troponin elevation with flat trend in setting of markedly uncontrolled hypertension.  Echocardiogram obtained which shows preserved EF and only grade 1 diastolic dysfunction.  He feels well and is stable on room air, requests discharge.  A.m. labs with hypokalemia.  Potassium replaced.  Repeat labs show potassium within normal limits at 3.5.  Instructed to follow up with PCP.  All findings and plan discussed with patient and his son.  All questions answered, they verbalized understanding and agreement.  Discharged home in stable condition.

## 2025-06-05 NOTE — PLAN OF CARE
06/05/25 0757   Discharge Planning   Assessment Type Discharge Planning Brief Assessment   Resource/Environmental Concerns none   Support Systems Friends/neighbors   Equipment Currently Used at Home oxygen   Current Living Arrangements home   Patient/Family Anticipates Transition to home   Patient/Family Anticipated Services at Transition none   DME Needed Upon Discharge  other (see comments)  (TBD)   Discharge Plan A Home  (with instructions to follow up)       [facility] [place] [facility] [id number] [place] - [address]  [address]  [place] [id number]  Phone: [phone] Fax: [phone]

## 2025-06-05 NOTE — PLAN OF CARE
Discharge orders noted. Additional clinical references attached.    Patient's discharge instructions given by bedside RN and reviewed via this VN with patient and family in Ukrainian.    # [id number]  Education provided on new medication, diagnosis, and follow-up appointments.    All questions answered. Teach back method used. Patient verbalized understanding.    Transport to [facility] requested. Floor nurse notified.      06/05/25 1720   AVS Confirmation   Discharge instructions and AVS provided to and reviewed with patient and/or significant other. Yes

## 2025-06-05 NOTE — ASSESSMENT & PLAN NOTE
Anemia is likely due to chronic disease due to Chronic Kidney Disease. Most recent hemoglobin and hematocrit are listed below.  Recent Labs     06/04/25  1302   HGB 13.3*   HCT 38.7*     Plan  - Monitor serial CBC: Daily  - Transfuse PRBC if patient becomes hemodynamically unstable, symptomatic or H/H drops below 7/21.  - Patient has not received any PRBC transfusions to date  - Patient's anemia is currently stable  - B12, Iron, folate levels ordered

## 2025-06-05 NOTE — ASSESSMENT & PLAN NOTE
Patient's blood pressure range in the last 24 hours was: BP  Min: 159/73  Max: 224/128.The patient's inpatient anti-hypertensive regimen is listed below:  Current Antihypertensives  amLODIPine tablet 5 mg, Daily, Oral  lisinopriL tablet 10 mg, Daily, Oral  hydroCHLOROthiazide tablet 25 mg, Daily, Oral  carvediloL tablet 25 mg, 2 times daily with meals, Oral    Plan  - BP is uncontrolled, will adjust as follows: Continue home medications   - Substitute benazepril 20 mg to losartan 10 mg daily as not in formulary

## 2025-06-05 NOTE — ASSESSMENT & PLAN NOTE
Patient with slightly elevated troponin likely due to demand ischemia from hypertension.  Continue to trend troponin  Continue daily aspirin and statin  Echocardiogram ordered  Can consider cardiology consultation if abnormal echo

## 2025-06-05 NOTE — PLAN OF CARE
Case Management Final Discharge Note    Discharge Disposition: Home    New DME ordered / company name: None    Relevant SDOH / Transition of Care Barriers:  None    Person available to provide assistance at home when needed and their contact information: [employer] (Friend) [phone]        Scheduled followup appointment: Patient instructed to follow up with PCP    Referrals placed: None    Transportation: Private vehicle/family taking patient home        Patient and family educated on discharge services and updated on DC plan.  Patient clear to discharge home.  CM unable to schedule hospital follow up due to clinic being closed; patient instructed to follow up with PCP in 1 week.  Bedside RN notified, patient clear to discharge from Case Management Perspective.    06/05/25 1606   Final Note   Assessment Type Final Discharge Note   Anticipated Discharge Disposition Home   Hospital Resources/Appts/Education Provided Provided patient/caregiver with written discharge plan information;Appointments scheduled and added to AVS   Post-Acute Status   Post-Acute Authorization Other   Other Status No Post-Acute Service Needs   Discharge Delays None known at this time

## 2025-06-05 NOTE — ASSESSMENT & PLAN NOTE
Hyponatremia is likely due to Dehydration/hypovolemia. The patient's most recent sodium results are listed below.  Recent Labs     06/04/25  1302   *     Plan  - Correct the sodium by 4-6mEq in 24 hours.   - Obtain the following studies: TSH, T4.  - Monitor sodium Daily.   - Patient hyponatremia is stable  - Monitor with repeat morning BMP

## 2025-06-05 NOTE — H&P
[facility]  History & Physical    Patient Name: Jenny George  MRN: [id number]  Patient Class: OP- Observation  Admission Date: 6/4/2025  Attending Physician: Hannah Seymour MD   Primary Care Provider: Matt Hendrix MD         Patient information was obtained from patient, past medical records, and ER records.     Subjective:     Principal Problem:Acute hypoxic respiratory failure    Chief Complaint:   Chief Complaint   Patient presents with    Shortness of Breath     BIB by ems With productive cough and fatigue since yesterday. States went to PCP yesterday for same. Initial O2 sats 92% on RA with EMS. Given duonebs en route with improvement. Hypertensive,has not taken BP meds today.Using theraflu at home, last dose 0800. Pt also noted to have drainage from RIGHT eye, EMS noted eye drops at patients home.        HPI: Jenny George is a 85 y.o. male with a pmh of hypertension, CKD 3, hyperlipidemia, pulmonary hypertension, CAD, hyperthyroidism presents to the hospital with complaints of worsening cough and shortness on breath for the past 2 weeks.    Patient is [place] speaking and  ID # [id number] used for interpretation.  Patient states he has a worsening cough for the past 2 weeks with increased sputum production (thick white sputum).  Patient also has associated rhinorrhea, congestion, subjective fever.  Patient states that he lives alone and denies any recent sick contacts.  Patient was given duo neb EN route with improvement in respiratory status.  Denies any other alleviating exacerbating factors.  Patient denies alcohol, tobacco, or illicit drug use.  Patient denies headache, blurry vision, chest pain, nausea, vomiting, diarrhea, abdominal pain, or any other associated symptoms.    In the ED: Patient afebrile without leukocytosis, hypertensive on presentation 224/128 with respiratory rate 30, O2 sats ranging 91-98%, H/H 13.3/38.7, MCV 99, sodium 133, potassium 3.4, GFR  59, T bili 1.4, , troponin 0.041--> 0.069, normal lactic acid and procalcitonin levels, COVID/influenza negative, UA with no acute findings, chest x-ray with no acute cardiopulmonary process, CTA chest shows (1) no PE (2) dilation of the main pulmonary artery which may be seen in pulmonary arterial hypotension (3) mild stable dilation of ascending aorta (4) subsegmental atelectasis in the right lower lobe.  CT abdomen pelvis shows no acute process.  Patient given aspirin 325 mg in the ED and placed under observation for further medical management.      Past Medical History:   Diagnosis Date    BPH (benign prostatic hyperplasia)     Hyperlipidemia     Hypertension     Hyperthyroidism        History reviewed. No pertinent surgical history.    Review of patient's allergies indicates:  No Known Allergies    No current facility-administered medications on file prior to encounter.     Current Outpatient Medications on File Prior to Encounter   Medication Sig    acetaminophen (TYLENOL) 500 MG tablet Take 500 mg by mouth every 6 (six) hours as needed.    amLODIPine (NORVASC) 5 MG tablet take 1 tablet by mouth once a day (uong gloria ngay, 1 yesika) ([facility]    ascorbic acid (VITAMIN C) 1000 MG tablet Take 1,000 mg by mouth once daily.    aspirin (ECOTRIN) 81 MG EC tablet Take 1 tablet (81 mg total) by mouth once daily.    atorvastatin (LIPITOR) 20 MG tablet     benazepril-hydrochlorthiazide (LOTENSIN HCT) 20-25 mg per tablet Take 1 tablet by mouth once daily.    carvedilol (COREG) 25 MG tablet Take 25 mg by mouth 2 (two) times daily with meals.    esomeprazole (NEXIUM) 40 MG capsule Take 1 capsule (40 mg total) by mouth before breakfast.    ferrous sulfate (FEOSOL) Tab tablet Take 1 tablet (1 each total) by mouth once daily.    latanoprost 0.005 % ophthalmic solution     LIDOcaine (LIDODERM) 5 % Place 1 patch onto the skin once daily. Remove & Discard patch within 12 hours or as directed by MD    mirtazapine  (REMERON) 15 MG tablet Take 1 tablet (15 mg total) by mouth every evening.    timolol maleate 0.5% (TIMOPTIC-XR) 0.5 % SolG     vitamin A 11936 UNIT capsule Take 10,000 Units by mouth once daily.    vitamin E 600 UNIT capsule Take 600 Units by mouth once daily.     Family History    None       Tobacco Use    Smoking status: Never    Smokeless tobacco: Never   Substance and Sexual Activity    Alcohol use: No    Drug use: No    Sexual activity: Not on file     Review of Systems   Reason unable to perform ROS: ROS was performed and pertinent +s and -s are listed in HPI.   Constitutional:  Positive for activity change, chills and fever.   HENT:  Positive for congestion and rhinorrhea.    Eyes:  Positive for discharge (Chronic right eye discharge).   Respiratory:  Positive for cough and shortness of breath.    Gastrointestinal: Negative.    Genitourinary: Negative.    Musculoskeletal: Negative.    Neurological: Negative.    Psychiatric/Behavioral: Negative.       Objective:     Vital Signs (Most Recent):  Temp: 99 °F (37.2 °C) (06/04/25 1300)  Pulse: 90 (06/04/25 1519)  Resp: (!) 30 (06/04/25 1249)  BP: (!) 170/80 (06/04/25 1519)  SpO2: (!) 94 % (06/04/25 1500) Vital Signs (24h Range):  Temp:  [99 °F (37.2 °C)] 99 °F (37.2 °C)  Pulse:  [79-90] 90  Resp:  [30] 30  SpO2:  [94 %-98 %] 94 %  BP: (159-224)/() 170/80     Weight: 65.8 kg (145 lb)  Body mass index is 26.52 kg/m².     Physical Exam  Constitutional:       General: He is not in acute distress.     Appearance: Normal appearance. He is not ill-appearing or diaphoretic.   HENT:      Head: Normocephalic and atraumatic.      Mouth/Throat:      Mouth: Mucous membranes are moist.   Eyes:      General:         Right eye: Discharge (Clear right eye discharge which is chronic) present.      Extraocular Movements: Extraocular movements intact.   Cardiovascular:      Rate and Rhythm: Normal rate and regular rhythm.      Pulses: Normal pulses.   Pulmonary:      Effort:  Pulmonary effort is normal.      Breath sounds: Normal breath sounds. No wheezing or rales.      Comments: Speaking in full sentences on RA  Abdominal:      General: Abdomen is flat.   Musculoskeletal:      Right lower leg: No edema.      Left lower leg: No edema.   Skin:     General: Skin is warm.   Neurological:      General: No focal deficit present.      Mental Status: He is alert and oriented to person, place, and time. Mental status is at baseline.   Psychiatric:         Mood and Affect: Mood normal.         Behavior: Behavior normal.         Thought Content: Thought content normal.                Significant Labs: All pertinent labs within the past 24 hours have been reviewed.  CBC:   Recent Labs   Lab 06/04/25  1302   WBC 8.21   HGB 13.3*   HCT 38.7*        CMP:   Recent Labs   Lab 06/04/25  1302   *   K 3.4*   CL 97   CO2 22*   *   BUN 16   CREATININE 1.2   CALCIUM 9.0   PROT 8.6*   ALBUMIN 4.0   BILITOT 1.4*   ALKPHOS 109   AST 39   ALT 17   ANIONGAP 14     Cardiac Markers:   Recent Labs   Lab 06/04/25  1302   *     Troponin:   Recent Labs   Lab 06/04/25  1302   TROPONINI 0.041*       Significant Imaging: I have reviewed all pertinent imaging results/findings within the past 24 hours.        Results for orders placed or performed during the hospital encounter of 06/04/25 (from the past 2160 hours)   CT Abdomen Pelvis With IV Contrast NO Oral Contrast    Narrative    EXAMINATION:  CT ABDOMEN PELVIS WITH IV CONTRAST; CTA CHEST NON CORONARY (PE STUDIES)    CLINICAL HISTORY:  Nausea/vomiting;; Pulmonary embolism (PE) suspected, high prob;    TECHNIQUE:  CTA of the chest and CT of the abdomen and pelvis was obtained after the administration 100 cc of Omnipaque 350 intravenous contrast.  No oral contrast was administered.  Axial MIP images were obtained.  Coronal and sagittal reconstructions were also obtained    COMPARISON:  CTA chest from 01/15/2022, CT abdomen pelvis from  12/01/2021.    FINDINGS:  Pulmonary arteries: Study is adequate for the evaluation of pulmonary thromboembolism.  No evidence of filling defect to the level of the segmental arteries.  No convincing evidence of pulmonary thromboembolism.    Dilatation of the main pulmonary artery measuring up to 3.9 cm similar to prior exam which may be seen with pulmonary arterial hypertension.    Thoracic soft tissues: Normal thyroid.    Aorta: Mild dilatation of the ascending aorta measuring up to 4.0 cm similar to prior exam.    Heart: Normal in size. No pericardial effusion. Severe calcific coronary atherosclerosis.    Jena/Mediastinum: No significant mediastinal, hilar, or axillary lymphadenopathy    Lungs: Trachea and bronchi are patent.  Small amount of atelectasis of the right lower lobe.    Liver: Normal in size and contour.  No focal hepatic lesion.    Gallbladder: No calcified gallstones.    Bile Ducts: No evidence of dilated ducts.    Pancreas: No mass or peripancreatic fat stranding.    Spleen: Unremarkable.    Stomach and duodenum: Unremarkable.    Adrenals: Stable 9 mm left adrenal nodule.    Kidneys/ Ureters: Kidneys are small in size.  Multiple subcentimeter hypodensities within the kidneys, too small to characterize favored to represent cysts.    Bladder: No evidence of wall thickening.    Reproductive organs: Unremarkable.    Bowel/Mesentery: Small bowel is normal in caliber with no evidence of obstruction.  No evidence of inflammation or wall thickening.  Colon demonstrates no focal wall thickening.    Lymph nodes: No lymphadenapathy.    Abdominal wall:  Bilateral fat containing inguinal hernias.  Fat containing umbilical hernia.    Vasculature: Ectasia of the abdominal aorta.  No aneurysm. Moderate calcific atherosclerosis.    Bones: No acute fracture. No suspicious osseous lesions.  Advanced degenerative changes of the right hip with flattening of the femoral head.  Findings are suggestive of avascular  necrosis versus advanced degenerative changes.      Impression    No convincing evidence pulmonary thromboembolism.    Dilatation of the main pulmonary artery which may be seen with pulmonary arterial hypertension.    Mild stable dilatation of the ascending aorta.    Minimal amount of subsegmental atelectasis in the right lower lobe.    No acute process in the abdomen or pelvis.    Additional findings as above.      Electronically signed by: Farooq Godinez MD  Date:    06/04/2025  Time:    15:54   CTA Chest Non-Coronary (PE Studies)    Narrative    EXAMINATION:  CT ABDOMEN PELVIS WITH IV CONTRAST; CTA CHEST NON CORONARY (PE STUDIES)    CLINICAL HISTORY:  Nausea/vomiting;; Pulmonary embolism (PE) suspected, high prob;    TECHNIQUE:  CTA of the chest and CT of the abdomen and pelvis was obtained after the administration 100 cc of Omnipaque 350 intravenous contrast.  No oral contrast was administered.  Axial MIP images were obtained.  Coronal and sagittal reconstructions were also obtained    COMPARISON:  CTA chest from 01/15/2022, CT abdomen pelvis from 12/01/2021.    FINDINGS:  Pulmonary arteries: Study is adequate for the evaluation of pulmonary thromboembolism.  No evidence of filling defect to the level of the segmental arteries.  No convincing evidence of pulmonary thromboembolism.    Dilatation of the main pulmonary artery measuring up to 3.9 cm similar to prior exam which may be seen with pulmonary arterial hypertension.    Thoracic soft tissues: Normal thyroid.    Aorta: Mild dilatation of the ascending aorta measuring up to 4.0 cm similar to prior exam.    Heart: Normal in size. No pericardial effusion. Severe calcific coronary atherosclerosis.    Jena/Mediastinum: No significant mediastinal, hilar, or axillary lymphadenopathy    Lungs: Trachea and bronchi are patent.  Small amount of atelectasis of the right lower lobe.    Liver: Normal in size and contour.  No focal hepatic lesion.    Gallbladder: No  calcified gallstones.    Bile Ducts: No evidence of dilated ducts.    Pancreas: No mass or peripancreatic fat stranding.    Spleen: Unremarkable.    Stomach and duodenum: Unremarkable.    Adrenals: Stable 9 mm left adrenal nodule.    Kidneys/ Ureters: Kidneys are small in size.  Multiple subcentimeter hypodensities within the kidneys, too small to characterize favored to represent cysts.    Bladder: No evidence of wall thickening.    Reproductive organs: Unremarkable.    Bowel/Mesentery: Small bowel is normal in caliber with no evidence of obstruction.  No evidence of inflammation or wall thickening.  Colon demonstrates no focal wall thickening.    Lymph nodes: No lymphadenapathy.    Abdominal wall:  Bilateral fat containing inguinal hernias.  Fat containing umbilical hernia.    Vasculature: Ectasia of the abdominal aorta.  No aneurysm. Moderate calcific atherosclerosis.    Bones: No acute fracture. No suspicious osseous lesions.  Advanced degenerative changes of the right hip with flattening of the femoral head.  Findings are suggestive of avascular necrosis versus advanced degenerative changes.      Impression    No convincing evidence pulmonary thromboembolism.    Dilatation of the main pulmonary artery which may be seen with pulmonary arterial hypertension.    Mild stable dilatation of the ascending aorta.    Minimal amount of subsegmental atelectasis in the right lower lobe.    No acute process in the abdomen or pelvis.    Additional findings as above.      Electronically signed by: Farooq Godinez MD  Date:    06/04/2025  Time:    15:54       Assessment/Plan:     Assessment & Plan  Acute hypoxic respiratory failure  Patient presents with a worsening cough and shortness on breath for the past 2 weeks.  Patient endorses subjective fevers/chills, productive cough with white thick mucus, rhinorrhea.  Denies any recent sick contacts.  COVID/influenza negative.  Lactic acid/procalcitonin level normal.  Patient  afebrile without leukocytosis in the ED. However, presented with elevated blood pressure and tachypneic.  Was treated in route with duo nebs via EMS with some improvement.  Likely viral  vs bacterial upper respiratory infection given the length of these symptoms. Holding off on starting antibiotics in the setting of normal temp, no white count, and no other signs of infection.     Plan:  Scheduled duo nebs q.6 hours  Supplemental oxygen as needed  Guaifenesin for cough  Respiratory infection panel ordered  Monitor respiratory status closely  Uncontrolled hypertension  Patient's blood pressure range in the last 24 hours was: BP  Min: 159/73  Max: 224/128.The patient's inpatient anti-hypertensive regimen is listed below:  Current Antihypertensives  amLODIPine tablet 5 mg, Daily, Oral  lisinopriL tablet 10 mg, Daily, Oral  hydroCHLOROthiazide tablet 25 mg, Daily, Oral  carvediloL tablet 25 mg, 2 times daily with meals, Oral    Plan  - BP is uncontrolled, will adjust as follows: Continue home medications   - Substitute benazepril 20 mg to losartan 10 mg daily as not in formulary  Hyperthyroidism  History noted, patient no longer on methimazole  TSH and free T4 levels ordered   Patient will need to follow up with Endocrinology outpatient  Hyponatremia  Hyponatremia is likely due to Dehydration/hypovolemia. The patient's most recent sodium results are listed below.  Recent Labs     06/04/25  1302   *     Plan  - Correct the sodium by 4-6mEq in 24 hours.   - Obtain the following studies: TSH, T4.  - Monitor sodium Daily.   - Patient hyponatremia is stable  - Monitor with repeat morning BMP  Macrocytic anemia  Anemia is likely due to chronic disease due to Chronic Kidney Disease. Most recent hemoglobin and hematocrit are listed below.  Recent Labs     06/04/25  1302   HGB 13.3*   HCT 38.7*     Plan  - Monitor serial CBC: Daily  - Transfuse PRBC if patient becomes hemodynamically unstable, symptomatic or H/H drops  below 7/21.  - Patient has not received any PRBC transfusions to date  - Patient's anemia is currently stable  - B12, Iron, folate levels ordered   Stage 3b chronic kidney disease  Creatine stable for now. BMP reviewed- noted Estimated Creatinine Clearance: 36.9 mL/min (based on SCr of 1.2 mg/dL). according to latest data. Based on current GFR, CKD stage is stage 3 - GFR 30-59.  Monitor UOP and serial BMP and adjust therapy as needed. Renally dose meds. Avoid nephrotoxic medications and procedures.  Hyperlipidemia  Continue home statin   Pulmonary hypertension  Patient's BNP slightly elevated 122.  CTA with dilatation of the main pulmonary artery which may be seen with the Pulmonary hypertension.  Patient has echocardiogram from 01/16/2022 with normal systolic function and EF 70% and normal PA pressure.  Repeat echocardiogram ordered  Elevated troponin  Patient with slightly elevated troponin likely due to demand ischemia from hypertension.  Continue to trend troponin  Continue daily aspirin and statin  Echocardiogram ordered  Can consider cardiology consultation if abnormal echo  Hypokalemia  Patient's most recent potassium results are listed below.   Recent Labs     06/04/25  1302   K 3.4*     Plan  - Replete potassium per protocol  - Monitor potassium Daily  - Patient's hypokalemia is stable  VTE Risk Mitigation (From admission, onward)           Ordered     enoxaparin injection 40 mg  Daily         06/04/25 1628     IP VTE HIGH RISK PATIENT  Once         06/04/25 1628     Place sequential compression device  Until discontinued         06/04/25 1628                         On 06/04/2025, patient should be placed in hospital observation services under my care in collaboration with Hannah Seymour MD.           Nikki Seymour PA-C  Department of Hospital Medicine  [facility]

## 2025-06-05 NOTE — ASSESSMENT & PLAN NOTE
Patient's most recent potassium results are listed below.   Recent Labs     06/04/25  1302   K 3.4*     Plan  - Replete potassium per protocol  - Monitor potassium Daily  - Patient's hypokalemia is stable

## 2025-06-05 NOTE — DISCHARGE SUMMARY
[facility] Medicine  Discharge Summary      Patient Name: Jenny George  MRN: [id number]  [facility]: [id number]  Patient Class: OP- Observation  Admission Date: 6/4/2025  Hospital Length of Stay: 0 days  Discharge Date and Time: 06/05/2025 3:35 PM  Attending Physician: Hannah Seymour MD   Discharging Provider: Conor Junior Jr, NP  Primary Care Provider: Matt Hendrix MD    Primary Care Team: CONOR JUNIOR    HPI:   Jenny George is a 85 y.o. male with a pmh of hypertension, CKD 3, hyperlipidemia, pulmonary hypertension, CAD, hyperthyroidism presents to the hospital with complaints of worsening cough and shortness on breath for the past 2 weeks.    Patient is B[place] speaking and  ID # [id number] used for interpretation.  Patient states he has a worsening cough for the past 2 weeks with increased sputum production (thick white sputum).  Patient also has associated rhinorrhea, congestion, subjective fever.  Patient states that he lives alone and denies any recent sick contacts.  Patient was given duo neb EN route with improvement in respiratory status.  Denies any other alleviating exacerbating factors.  Patient denies alcohol, tobacco, or illicit drug use.  Patient denies headache, blurry vision, chest pain, nausea, vomiting, diarrhea, abdominal pain, or any other associated symptoms.    In the ED: Patient afebrile without leukocytosis, hypertensive on presentation 224/128 with respiratory rate 30, O2 sats ranging 91-98%, H/H 13.3/38.7, MCV 99, sodium 133, potassium 3.4, GFR 59, T bili 1.4, , troponin 0.041--> 0.069, normal lactic acid and procalcitonin levels, COVID/influenza negative, UA with no acute findings, chest x-ray with no acute cardiopulmonary process, CTA chest shows (1) no PE (2) dilation of the main pulmonary artery which may be seen in pulmonary arterial hypotension (3) mild stable dilation of ascending aorta (4) subsegmental atelectasis in the right lower lobe.  CT abdomen  pelvis shows no acute process.  Patient given aspirin 325 mg in the ED and placed under observation for further medical management.      * No surgery found *      Hospital Course:   Mr. George was placed in observation after presenting with cough and dyspnea for the past week or 2.  Initially in the ED he was found to be markedly hypertensive.  Chest imaging including CTA chest clear for pneumonia or PE or other concerning finding.  CT abdomen pelvis also unremarkable for acute abnormality.  Minimal troponin elevation with flat trend in setting of markedly uncontrolled hypertension.  Echocardiogram obtained which shows preserved EF and only grade 1 diastolic dysfunction.  He feels well and is stable on room air, requests discharge.  A.m. labs with hypokalemia.  Potassium replaced.  Repeat labs show potassium within normal limits at 3.5.  Instructed to follow up with PCP.  All findings and plan discussed with patient and his son.  All questions answered, they verbalized understanding and agreement.  Discharged home in stable condition.     Goals of Care Treatment Preferences:  Code Status: Full Code      SDOH Screening:  The patient was screened for utility difficulties, food insecurity, transport difficulties, housing insecurity, and interpersonal safety and there were no concerns identified this admission.     Consults:     Assessment & Plan  Uncontrolled hypertension  Patient's blood pressure range in the last 24 hours was: BP  Min: 126/68  Max: 188/81.The patient's inpatient anti-hypertensive regimen is listed below:  Current Antihypertensives  amLODIPine tablet 5 mg, Daily, Oral  lisinopriL tablet 10 mg, Daily, Oral  carvediloL tablet 25 mg, 2 times daily with meals, Oral    Plan  - BP is uncontrolled, will adjust as follows: Continue home medications   - Substitute benazepril 20 mg to losartan 10 mg daily as not in formulary  Hyperthyroidism  History noted, patient no longer on methimazole  TSH and free T4 levels  ordered   Patient will need to follow up with Endocrinology outpatient  Macrocytic anemia  Anemia is likely due to chronic disease due to Chronic Kidney Disease. Most recent hemoglobin and hematocrit are listed below.  Recent Labs     06/04/25  1302 06/05/25  0426   HGB 13.3* 12.4*   HCT 38.7* 35.6*     Plan  - Monitor serial CBC: Daily  - Transfuse PRBC if patient becomes hemodynamically unstable, symptomatic or H/H drops below 7/21.  - Patient has not received any PRBC transfusions to date  - Patient's anemia is currently stable  - B12, Iron, folate levels ordered   Stage 3b chronic kidney disease  Creatine stable for now. BMP reviewed- noted Estimated Creatinine Clearance: 39.9 mL/min (based on SCr of 1.2 mg/dL). according to latest data. Based on current GFR, CKD stage is stage 3 - GFR 30-59.  Monitor UOP and serial BMP and adjust therapy as needed. Renally dose meds. Avoid nephrotoxic medications and procedures.  Hyperlipidemia  Continue home statin   Pulmonary hypertension  Patient's BNP slightly elevated 122.  CTA with dilatation of the main pulmonary artery which may be seen with the Pulmonary hypertension.  Patient has echocardiogram from 01/16/2022 with normal systolic function and EF 70% and normal PA pressure.  Repeat echocardiogram ordered  Elevated troponin  Patient with slightly elevated troponin likely due to demand ischemia from hypertension.  Continue to trend troponin  Continue daily aspirin and statin  Echocardiogram ordered  Can consider cardiology consultation if abnormal echo  Final Active Diagnoses:    Diagnosis Date Noted POA    PRINCIPAL PROBLEM:  Uncontrolled hypertension [I10] 10/02/2012 Yes    Elevated troponin [R79.89] 01/15/2022 Yes    Stage 3b chronic kidney disease [N18.32] 12/01/2021 Yes    Pulmonary hypertension [I27.20] 12/01/2021 Yes    Macrocytic anemia [D53.9] 12/01/2021 Yes    Hyperlipidemia [E78.5] 12/01/2021 Yes    Hyperthyroidism [E05.90] 10/02/2012 Yes      Problems  Resolved During this Admission:    Diagnosis Date Noted Date Resolved POA    Hypokalemia [E87.6] 06/04/2025 06/05/2025 Yes    Acute hypoxic respiratory failure [J96.01] 06/04/2025 06/05/2025 Yes    Hyponatremia [E87.1] 12/01/2021 06/05/2025 Yes       Discharged Condition: stable    Disposition: Home or Self Care    Follow Up:   Follow-up Information       Matt Hendrix MD Follow up.    Specialty: Family Medicine  Contact information:  [address] [place] [id number] [phone]                           Patient Instructions:      Diet Cardiac     Activity as tolerated       Significant Diagnostic Studies: Labs: CMP   Recent Labs   Lab 06/04/25  1302 06/05/25  0426 06/05/25  1410   * 134* 136   K 3.4* 2.8* 3.5   CL 97 96 95   CO2 22* 24 24   * 82 101   BUN 16 20 22   CREATININE 1.2 1.2 1.2   CALCIUM 9.0 8.8 9.0   PROT 8.6* 7.8  --    ALBUMIN 4.0 3.5  --    BILITOT 1.4* 1.3*  --    ALKPHOS 109 98  --    AST 39 34  --    ALT 17 18  --    ANIONGAP 14 14 17*    and CBC   Recent Labs   Lab 06/04/25  1302 06/05/25  0426   WBC 8.21 7.05   HGB 13.3* 12.4*   HCT 38.7* 35.6*    155     Cardiac Graphics: Echocardiogram: Transthoracic echo (TTE) complete (Cupid Only):   Results for orders placed or performed during the hospital encounter of 06/04/25   Echo   Result Value Ref Range    BSA 1.77 m2    LVOT stroke volume 71.0 cm3    LVIDd 4.2 3.5 - 6.0 cm    LV Systolic Volume 38 mL    LV Systolic Volume Index 21.6 mL/m2    LVIDs 3.1 2.1 - 4.0 cm    LV Diastolic Volume 81 mL    LV Diastolic Volume Index 46.02 mL/m2    Left Ventricular End Systolic Volume by Teichholz Method 37.80 mL    Left Ventricular End Diastolic Volume by Teichholz Method 80.57 mL    IVS 1.2 (A) 0.6 - 1.1 cm    LVOT diameter 2.4 cm    LVOT area 4.5 cm2    FS 26.2 (A) 28 - 44 %    Left Ventricle Relative Wall Thickness 0.52 cm    PW 1.1 0.6 - 1.1 cm    LV mass 167.4 g    LV Mass Index 95.1 g/m2    MV Peak E Saurabh 0.50 m/s    MV Peak A Saurabh  0.97 m/s    TR Max Saurabh 3.0 m/s    E/A ratio 0.52     IVRT 91 msec    E wave deceleration time 108 msec    LVOT peak saurabh 0.9 m/s    Left Ventricular Outflow Tract Mean Velocity 0.58 cm/s    Left Ventricular Outflow Tract Mean Gradient 1.56 mmHg    RV- holland basal diam 2.8 cm    RV/LV Ratio 0.67 cm    LA size 4.4 cm    Left Atrium Minor Axis 4.0 cm    Left Atrium Major Axis 3.9 cm    RA Major Axis 3.99 cm    AV mean gradient 3 mmHg    AV peak gradient 5 mmHg    Ao peak saurabh 1.1 m/s    Ao VTI 22.0 cm    LVOT peak VTI 15.7 cm    AV valve area 3.2 cm²    AV Velocity Ratio 0.82     AV index (prosthetic) 0.71     ARIEL by Velocity Ratio 3.7 cm²    MV stenosis pressure 1/2 time 31.18 ms    MV valve area p 1/2 method 7.06 cm2    TV peak gradient 1 mmHg    Triscuspid Valve Regurgitation Peak Gradient 37 mmHg    PV PEAK VELOCITY 1.00 m/s    PV peak gradient 4 mmHg    Sinus 3.71 cm    ASI 2.1 cm/m2    STJ 3.4 cm    Ascending aorta 4.0 cm    ASI 2.3 cm/m2    IVC diameter 1.09 cm    ZLVIDS 0.23     ZLVIDD -1.47     RVDD 2.76 cm    TV resting pulmonary artery pressure 39 mmHg    RV TB RVSP 6 mmHg    Est. RA pres 3 mmHg    Narrative      Left Ventricle: The left ventricle is normal in size. Mildly increased   wall thickness. There is normal systolic function with a visually   estimated ejection fraction of 60 - 65%. Grade I diastolic dysfunction.    Right Ventricle: The right ventricle is normal in size Systolic   function is normal.    Left Atrium: The left atrium is mildly dilated    Aortic Valve: There is mild aortic regurgitation.    Mitral Valve: There is mild regurgitation.    Aorta: The ascending aorta is mildly dilated measuring 4.0 cm.    Pulmonary Artery: The estimated pulmonary artery systolic pressure is   39 mmHg.    IVC/SVC: Normal venous pressure at 3 mmHg.         Pending Diagnostic Studies:       None           Medications:  Reconciled Home Medications:      Medication List        START taking these medications       benzonatate 100 MG capsule  Commonly known as: TESSALON  Take 1 capsule (100 mg total) by mouth 3 (three) times daily as needed for Cough.            CONTINUE taking these medications      acetaminophen 500 MG tablet  Commonly known as: TYLENOL  Take 500 mg by mouth every 6 (six) hours as needed.     amLODIPine 5 MG tablet  Commonly known as: NORVASC  take 1 tablet by mouth once a day (uong gloria ngay, 1 yesika) (thuoc jordan jose)     ascorbic acid (vitamin C) 1000 MG tablet  Commonly known as: VITAMIN C  Take 1,000 mg by mouth once daily.     aspirin 81 MG EC tablet  Commonly known as: ECOTRIN  Take 1 tablet (81 mg total) by mouth once daily.     atorvastatin 20 MG tablet  Commonly known as: LIPITOR     benazepril-hydrochlorthiazide 20-25 mg Tab  Commonly known as: LOTENSIN HCT  Take 1 tablet by mouth once daily.     carvediloL 25 MG tablet  Commonly known as: COREG  Take 25 mg by mouth 2 (two) times daily with meals.     esomeprazole 40 MG capsule  Commonly known as: NexIUM  Take 1 capsule (40 mg total) by mouth before breakfast.     ferrous sulfate Tab tablet  Commonly known as: FEOSOL  Take 1 tablet (1 each total) by mouth once daily.     latanoprost 0.005 % ophthalmic solution     LIDOcaine 5 %  Commonly known as: LIDODERM  Place 1 patch onto the skin once daily. Remove & Discard patch within 12 hours or as directed by MD     mirtazapine 15 MG tablet  Commonly known as: REMERON  Take 1 tablet (15 mg total) by mouth every evening.     timolol maleate 0.5% 0.5 % Solg  Commonly known as: TIMOPTIC-XE     vitamin A 64545 UNIT capsule  Take 10,000 Units by mouth once daily.     vitamin E 600 UNIT capsule  Take 600 Units by mouth once daily.              Indwelling Lines/Drains at time of discharge:   Lines/Drains/Airways       None                   Time spent on the discharge of patient: 35 minutes         Conor Junior Jr, NP  Department of [facility] Medicine  [facility]

## 2025-06-05 NOTE — ASSESSMENT & PLAN NOTE
Creatine stable for now. BMP reviewed- noted Estimated Creatinine Clearance: 36.9 mL/min (based on SCr of 1.2 mg/dL). according to latest data. Based on current GFR, CKD stage is stage 3 - GFR 30-59.  Monitor UOP and serial BMP and adjust therapy as needed. Renally dose meds. Avoid nephrotoxic medications and procedures.

## 2025-06-05 NOTE — ASSESSMENT & PLAN NOTE
Patient presents with a worsening cough and shortness on breath for the past 2 weeks.  Patient endorses subjective fevers/chills, productive cough with white thick mucus, rhinorrhea.  Denies any recent sick contacts.  COVID/influenza negative.  Lactic acid/procalcitonin level normal.  Patient afebrile without leukocytosis in the ED. However, presented with elevated blood pressure and tachypneic.  Was treated in route with duo nebs via EMS with some improvement.  Likely viral  vs bacterial upper respiratory infection given the length of these symptoms. Holding off on starting antibiotics in the setting of normal temp, no white count, and no other signs of infection.     Plan:  Scheduled duo nebs q.6 hours  Supplemental oxygen as needed  Guaifenesin for cough  Respiratory infection panel ordered  Monitor respiratory status closely

## 2025-06-05 NOTE — ASSESSMENT & PLAN NOTE
History noted, patient no longer on methimazole  TSH and free T4 levels ordered   Patient will need to follow up with Endocrinology outpatient

## 2025-06-05 NOTE — ASSESSMENT & PLAN NOTE
Patient's blood pressure range in the last 24 hours was: BP  Min: 126/68  Max: 188/81.The patient's inpatient anti-hypertensive regimen is listed below:  Current Antihypertensives  amLODIPine tablet 5 mg, Daily, Oral  lisinopriL tablet 10 mg, Daily, Oral  carvediloL tablet 25 mg, 2 times daily with meals, Oral    Plan  - BP is uncontrolled, will adjust as follows: Continue home medications   - Substitute benazepril 20 mg to losartan 10 mg daily as not in formulary

## 2025-06-05 NOTE — NURSING
[facility] Note -- 4 Eyes    Report received, pt laying in bed on RA, NAD noted at this time. Will cont to monitor   6/5/2025       Skin assessed on: Q Shift      [] No Pressure Injuries Present    []Prevention Measures Documented    [] Yes LDA  for Pressure Injury Previously documented     [] Yes New Pressure Injury Discovered   [] LDA for New Pressure Injury Added      Attending RN:  Zeina Flores LPN     Second RN:  ROSA Eaton

## 2025-06-07 RX ORDER — ONDANSETRON 4 MG/1
4 TABLET, FILM COATED ORAL EVERY 8 HOURS PRN
Qty: 12 TABLET | Refills: 0 | Status: SHIPPED | OUTPATIENT
Start: 2025-06-07 | End: 2025-06-19